# Patient Record
Sex: FEMALE | Race: WHITE | Employment: UNEMPLOYED | URBAN - METROPOLITAN AREA
[De-identification: names, ages, dates, MRNs, and addresses within clinical notes are randomized per-mention and may not be internally consistent; named-entity substitution may affect disease eponyms.]

---

## 2021-05-17 ENCOUNTER — INITIAL PRENATAL (OUTPATIENT)
Dept: OBGYN CLINIC | Age: 31
End: 2021-05-17
Payer: OTHER GOVERNMENT

## 2021-05-17 VITALS
SYSTOLIC BLOOD PRESSURE: 112 MMHG | DIASTOLIC BLOOD PRESSURE: 72 MMHG | BODY MASS INDEX: 24.51 KG/M2 | HEIGHT: 65 IN | WEIGHT: 147.13 LBS

## 2021-05-17 DIAGNOSIS — Z3A.31 31 WEEKS GESTATION OF PREGNANCY: Primary | ICD-10-CM

## 2021-05-17 PROCEDURE — 0501F PRENATAL FLOW SHEET: CPT | Performed by: OBSTETRICS & GYNECOLOGY

## 2021-05-17 NOTE — PROGRESS NOTES
Chief Complaint   Patient presents with    New Patient     Initial OB visit     Visit Vitals  /72 (BP 1 Location: Left upper arm, BP Patient Position: Sitting, BP Cuff Size: Small adult)   Ht 5' 4.5\" (1.638 m)   Wt 147 lb 2 oz (66.7 kg)   BMI 24.86 kg/m²     Record release signed. Had PN labs/pap with MD in [place]. Breast pump order given. OB packet NV.

## 2021-05-17 NOTE — PROGRESS NOTES
Gian Easton is a [id number] [id number], 31 y.o. female   No LMP recorded. Patient is pregnant. She presents for her new OB    She is having no significant problems. Menstrual status:  Cycles are pregnant. Flow: absent. She does not have dysmenorrhea. Medical conditions:  Since her last annual GYN exam about one year ago, she has not the following changes in her health history: none. Mammogram History:    GRIS Results (most recent):  No results found for this or any previous visit. DEXA Results (most recent):  No results found for this or any previous visit. History reviewed. No pertinent past medical history. History reviewed. No pertinent surgical history. Prior to Admission medications    Medication Sig Start Date End Date Taking? Authorizing Provider   prenatal vit37/iron/folic acid (PRENATA PO) Take  by mouth. Yes Provider, Historical   AMBULATORY BREAST PUMP 1 unit 5/17/21  Yes Sanjuanita Melgar MD       No Known Allergies       Tobacco History:  reports that she has never smoked. She has never used smokeless tobacco.  Alcohol Abuse:  reports previous alcohol use. Drug Abuse:  reports no history of drug use. Family Medical/Cancer History:   Family History   Problem Relation Age of Onset    Breast Cancer Mother           Review of Systems   Constitutional: Negative for chills, fever, malaise/fatigue and weight loss. HENT: Negative for congestion, ear pain, sinus pain and tinnitus. Eyes: Negative for blurred vision and double vision. Respiratory: Negative for cough, shortness of breath and wheezing. Cardiovascular: Negative for chest pain and palpitations. Gastrointestinal: Negative for abdominal pain, blood in stool, constipation, diarrhea, heartburn, nausea and vomiting. Genitourinary: Negative for dysuria, flank pain, frequency, hematuria and urgency. Musculoskeletal: Negative for joint pain and myalgias. Skin: Negative for itching and rash. Neurological: Negative for dizziness, weakness and headaches. Psychiatric/Behavioral: Negative for depression, memory loss and suicidal ideas. The patient is not nervous/anxious and does not have insomnia. Physical Exam  Constitutional:       Appearance: Normal appearance. HENT:      Head: Normocephalic and atraumatic. Abdominal:      General: Abdomen is protuberant. Comments: Gravid  +FHT's  Vtx on bedside US   Neurological:      Mental Status: She is alert. Psychiatric:         Mood and Affect: Mood normal.         Behavior: Behavior normal.         Thought Content: Thought content normal.          Visit Vitals  /72 (BP 1 Location: Left upper arm, BP Patient Position: Sitting, BP Cuff Size: Small adult)   Ht 5' 4.5\" (1.638 m)   Wt 147 lb 2 oz (66.7 kg)   BMI 24.86 kg/m²         Assessment:  Diagnoses and all orders for this visit:    1. 31 weeks gestation of pregnancy    Other orders  -     AMBULATORY BREAST PUMP; 1 unit        Plan:Questions addressed  Counseled re: diet, exercise, healthy lifestyle  Get PN records      Follow-up and Dispositions    · Return in about 2 weeks (around 5/31/2021) for OB Visit.

## 2021-06-01 ENCOUNTER — ROUTINE PRENATAL (OUTPATIENT)
Dept: OBGYN CLINIC | Age: 31
End: 2021-06-01
Payer: OTHER GOVERNMENT

## 2021-06-01 VITALS
WEIGHT: 148 LBS | BODY MASS INDEX: 24.66 KG/M2 | DIASTOLIC BLOOD PRESSURE: 62 MMHG | SYSTOLIC BLOOD PRESSURE: 116 MMHG | HEIGHT: 65 IN

## 2021-06-01 DIAGNOSIS — Z3A.33 33 WEEKS GESTATION OF PREGNANCY: Primary | ICD-10-CM

## 2021-06-01 PROCEDURE — 0502F SUBSEQUENT PRENATAL CARE: CPT | Performed by: OBSTETRICS & GYNECOLOGY

## 2021-06-01 NOTE — PROGRESS NOTES
Questions addressed  [facility] info in chart from previous MD  GS: [phone]- DWP

## 2021-06-01 NOTE — PROGRESS NOTES
Chief Complaint   Patient presents with    Follow-up     ob     Visit Vitals  /62 (BP 1 Location: Left upper arm, BP Patient Position: Sitting, BP Cuff Size: Small adult)   Ht 5' 4.5\" (1.638 m)   Wt 148 lb (67.1 kg)   BMI 25.01 kg/m²     GBS/Hgb nv

## 2021-06-16 ENCOUNTER — ROUTINE PRENATAL (OUTPATIENT)
Dept: OBGYN CLINIC | Age: 31
End: 2021-06-16
Payer: OTHER GOVERNMENT

## 2021-06-16 VITALS
WEIGHT: 150 LBS | SYSTOLIC BLOOD PRESSURE: 110 MMHG | DIASTOLIC BLOOD PRESSURE: 62 MMHG | BODY MASS INDEX: 24.99 KG/M2 | HEIGHT: 65 IN

## 2021-06-16 DIAGNOSIS — Z13.83 SCREENING FOR RESPIRATORY CONDITION: Primary | ICD-10-CM

## 2021-06-16 DIAGNOSIS — Z36.85 ANTENATAL SCREENING FOR STREPTOCOCCUS B: ICD-10-CM

## 2021-06-16 DIAGNOSIS — Z3A.35 35 WEEKS GESTATION OF PREGNANCY: ICD-10-CM

## 2021-06-16 PROCEDURE — 0502F SUBSEQUENT PRENATAL CARE: CPT | Performed by: OBSTETRICS & GYNECOLOGY

## 2021-06-16 NOTE — PROGRESS NOTES
Chief Complaint   Patient presents with    Follow-up     ob     Visit Vitals  /62 (BP 1 Location: Left upper arm, BP Patient Position: Sitting, BP Cuff Size: Small adult)   Ht 5' 4.5\" (1.638 m)   Wt 150 lb (68 kg)   BMI 25.35 kg/m²     GBS/ Covid order done & faxed today.

## 2021-06-20 LAB — B-HEM STREP SPEC QL CULT: NEGATIVE

## 2021-06-28 ENCOUNTER — ROUTINE PRENATAL (OUTPATIENT)
Dept: OBGYN CLINIC | Age: 31
End: 2021-06-28
Payer: OTHER GOVERNMENT

## 2021-06-28 VITALS
SYSTOLIC BLOOD PRESSURE: 114 MMHG | HEIGHT: 65 IN | DIASTOLIC BLOOD PRESSURE: 72 MMHG | WEIGHT: 152 LBS | BODY MASS INDEX: 25.33 KG/M2

## 2021-06-28 DIAGNOSIS — Z3A.37 37 WEEKS GESTATION OF PREGNANCY: Primary | ICD-10-CM

## 2021-06-28 PROCEDURE — 0502F SUBSEQUENT PRENATAL CARE: CPT | Performed by: OBSTETRICS & GYNECOLOGY

## 2021-06-28 NOTE — PROGRESS NOTES
Cc  Visit Vitals  /72 (BP 1 Location: Left upper arm, BP Patient Position: Sitting, BP Cuff Size: Small adult)   Ht 5' 4.5\" (1.638 m)   Wt 152 lb (68.9 kg)   BMI 25.69 kg/m²

## 2021-07-08 ENCOUNTER — ROUTINE PRENATAL (OUTPATIENT)
Dept: OBGYN CLINIC | Age: 31
End: 2021-07-08
Payer: OTHER GOVERNMENT

## 2021-07-08 ENCOUNTER — HOSPITAL ENCOUNTER (INPATIENT)
Age: 31
LOS: 4 days | Discharge: HOME | DRG: 773 | End: 2021-07-12
Attending: OBSTETRICS & GYNECOLOGY | Admitting: OBSTETRICS & GYNECOLOGY
Payer: OTHER GOVERNMENT

## 2021-07-08 VITALS
SYSTOLIC BLOOD PRESSURE: 124 MMHG | DIASTOLIC BLOOD PRESSURE: 72 MMHG | WEIGHT: 155.19 LBS | BODY MASS INDEX: 25.85 KG/M2 | HEIGHT: 65 IN

## 2021-07-08 DIAGNOSIS — Z3A.38 38 WEEKS GESTATION OF PREGNANCY: Primary | ICD-10-CM

## 2021-07-08 DIAGNOSIS — G89.18 POST-OP PAIN: Primary | ICD-10-CM

## 2021-07-08 PROBLEM — Z34.90 PREGNANCY: Status: ACTIVE | Noted: 2021-07-08

## 2021-07-08 LAB
AMPHET UR QL SCN: NEGATIVE
APPEARANCE UR: ABNORMAL
BACTERIA URNS QL MICRO: NEGATIVE /HPF
BARBITURATES UR QL SCN: NEGATIVE
BASOPHILS # BLD: 0 K/UL (ref 0–0.1)
BASOPHILS NFR BLD: 0 % (ref 0–1)
BENZODIAZ UR QL: NEGATIVE
BILIRUB UR QL: NEGATIVE
CANNABINOIDS UR QL SCN: NEGATIVE
COCAINE UR QL SCN: NEGATIVE
COLOR UR: ABNORMAL
DAILY QC (YES/NO)?: YES
DIFFERENTIAL METHOD BLD: NORMAL
DRUG SCRN COMMENT,DRGCM: NORMAL
EOSINOPHIL # BLD: 0.1 K/UL (ref 0–0.4)
EOSINOPHIL NFR BLD: 1 % (ref 0–7)
ERYTHROCYTE [DISTWIDTH] IN BLOOD BY AUTOMATED COUNT: 12.2 % (ref 11.5–14.5)
GLUCOSE UR STRIP.AUTO-MCNC: NEGATIVE MG/DL
HCT VFR BLD AUTO: 39.4 % (ref 35–47)
HGB BLD-MCNC: 13.2 G/DL (ref 11.5–16)
HGB UR QL STRIP: ABNORMAL
IMM GRANULOCYTES # BLD AUTO: 0 K/UL (ref 0–0.04)
IMM GRANULOCYTES NFR BLD AUTO: 0 % (ref 0–0.5)
KETONES UR QL STRIP.AUTO: NEGATIVE MG/DL
LEUKOCYTE ESTERASE UR QL STRIP.AUTO: ABNORMAL
LYMPHOCYTES # BLD: 1.8 K/UL (ref 0.8–3.5)
LYMPHOCYTES NFR BLD: 18 % (ref 12–49)
MCH RBC QN AUTO: 31.5 PG (ref 26–34)
MCHC RBC AUTO-ENTMCNC: 33.5 G/DL (ref 30–36.5)
MCV RBC AUTO: 94 FL (ref 80–99)
METHADONE UR QL: NEGATIVE
MONOCYTES # BLD: 0.7 K/UL (ref 0–1)
MONOCYTES NFR BLD: 8 % (ref 5–13)
MUCOUS THREADS URNS QL MICRO: ABNORMAL /LPF
NEUTS SEG # BLD: 7 K/UL (ref 1.8–8)
NEUTS SEG NFR BLD: 73 % (ref 32–75)
NITRITE UR QL STRIP.AUTO: NEGATIVE
NRBC # BLD: 0 K/UL (ref 0–0.01)
NRBC BLD-RTO: 0 PER 100 WBC
OPIATES UR QL: NEGATIVE
PCP UR QL: NEGATIVE
PH UR STRIP: 6 [PH] (ref 5–8)
PH, VAGINAL FLUID: 4 (ref 5–6.1)
PLATELET # BLD AUTO: 273 K/UL (ref 150–400)
PMV BLD AUTO: 10.4 FL (ref 8.9–12.9)
PROT UR STRIP-MCNC: 100 MG/DL
RBC # BLD AUTO: 4.19 M/UL (ref 3.8–5.2)
RBC #/AREA URNS HPF: ABNORMAL /HPF (ref 0–5)
SARS-COV-2, COV2: NORMAL
SP GR UR REFRACTOMETRY: 1.03 (ref 1–1.03)
UROBILINOGEN UR QL STRIP.AUTO: 0.1 EU/DL (ref 0.1–1)
WBC # BLD AUTO: 9.7 K/UL (ref 3.6–11)
WBC URNS QL MICRO: ABNORMAL /HPF (ref 0–4)

## 2021-07-08 PROCEDURE — 81001 URINALYSIS AUTO W/SCOPE: CPT

## 2021-07-08 PROCEDURE — 99285 EMERGENCY DEPT VISIT HI MDM: CPT

## 2021-07-08 PROCEDURE — 96361 HYDRATE IV INFUSION ADD-ON: CPT

## 2021-07-08 PROCEDURE — 87635 SARS-COV-2 COVID-19 AMP PRB: CPT

## 2021-07-08 PROCEDURE — 80307 DRUG TEST PRSMV CHEM ANLYZR: CPT

## 2021-07-08 PROCEDURE — 65410000002 HC RM PRIVATE OB

## 2021-07-08 PROCEDURE — 83986 ASSAY PH BODY FLUID NOS: CPT | Performed by: OBSTETRICS & GYNECOLOGY

## 2021-07-08 PROCEDURE — 96360 HYDRATION IV INFUSION INIT: CPT

## 2021-07-08 PROCEDURE — 36415 COLL VENOUS BLD VENIPUNCTURE: CPT

## 2021-07-08 PROCEDURE — 86923 COMPATIBILITY TEST ELECTRIC: CPT

## 2021-07-08 PROCEDURE — 80081 OBSTETRIC PANEL INC HIV TSTG: CPT

## 2021-07-08 PROCEDURE — 74011250636 HC RX REV CODE- 250/636: Performed by: OBSTETRICS & GYNECOLOGY

## 2021-07-08 PROCEDURE — 0502F SUBSEQUENT PRENATAL CARE: CPT | Performed by: OBSTETRICS & GYNECOLOGY

## 2021-07-08 RX ORDER — ACETAMINOPHEN 325 MG/1
650 TABLET ORAL
Status: DISCONTINUED | OUTPATIENT
Start: 2021-07-08 | End: 2021-07-10

## 2021-07-08 RX ORDER — OXYTOCIN/RINGER'S LACTATE 30/500 ML
10 PLASTIC BAG, INJECTION (ML) INTRAVENOUS AS NEEDED
Status: DISCONTINUED | OUTPATIENT
Start: 2021-07-08 | End: 2021-07-10

## 2021-07-08 RX ORDER — OXYTOCIN/RINGER'S LACTATE 30/500 ML
87.3 PLASTIC BAG, INJECTION (ML) INTRAVENOUS AS NEEDED
Status: COMPLETED | OUTPATIENT
Start: 2021-07-08 | End: 2021-07-10

## 2021-07-08 RX ORDER — LIDOCAINE HYDROCHLORIDE 10 MG/ML
30 INJECTION INFILTRATION; PERINEURAL
Status: ACTIVE | OUTPATIENT
Start: 2021-07-08 | End: 2021-07-09

## 2021-07-08 RX ORDER — SODIUM CHLORIDE, SODIUM LACTATE, POTASSIUM CHLORIDE, CALCIUM CHLORIDE 600; 310; 30; 20 MG/100ML; MG/100ML; MG/100ML; MG/100ML
125 INJECTION, SOLUTION INTRAVENOUS CONTINUOUS
Status: DISCONTINUED | OUTPATIENT
Start: 2021-07-08 | End: 2021-07-12 | Stop reason: HOSPADM

## 2021-07-08 RX ORDER — ONDANSETRON 2 MG/ML
4 INJECTION INTRAMUSCULAR; INTRAVENOUS
Status: DISCONTINUED | OUTPATIENT
Start: 2021-07-08 | End: 2021-07-10

## 2021-07-08 RX ADMIN — SODIUM CHLORIDE, POTASSIUM CHLORIDE, SODIUM LACTATE AND CALCIUM CHLORIDE 1000 ML: 600; 310; 30; 20 INJECTION, SOLUTION INTRAVENOUS at 19:40

## 2021-07-08 RX ADMIN — SODIUM CHLORIDE, POTASSIUM CHLORIDE, SODIUM LACTATE AND CALCIUM CHLORIDE 125 ML/HR: 600; 310; 30; 20 INJECTION, SOLUTION INTRAVENOUS at 20:46

## 2021-07-08 RX ADMIN — SODIUM CHLORIDE, POTASSIUM CHLORIDE, SODIUM LACTATE AND CALCIUM CHLORIDE 1000 ML: 600; 310; 30; 20 INJECTION, SOLUTION INTRAVENOUS at 18:20

## 2021-07-08 NOTE — PROGRESS NOTES
No chief complaint on file.     Visit Vitals  /72 (BP 1 Location: Right arm, BP Patient Position: Sitting, BP Cuff Size: Small adult)   Ht 5' 4.5\" (1.638 m)   Wt 155 lb 3 oz (70.4 kg)   BMI 26.23 kg/m²

## 2021-07-08 NOTE — PROGRESS NOTES
1800: Patient arrived ambulatory to the unit from home. Patient was seen in Dr. Dirk Ruggiero' office today and was 3 cm and seen due to \"loss of mucus plug and vaginal bleeding. \" Patient said she lost it last night. When she showed a picture, a bloody clot was shown-patient to show Dr. Emerson Steve when he evaluates patient. Patient denies any pain at this time. Patient endorses fetal movement. Patient denies contractions, leaking of fluids, shortness of breath, or swelling. Patient states she did have sexual intercourse last night. Patient escorted to bathroom where she was instructed to leave a urine sample. 1812: EFM initiated at this time. Triage and assessment started. 1814: Dr. Emerson Steve aware of patient's arrival. Orders received for peripheral IV, CBC, and LR bolus. Orders placed. Dr. Emerson Steve to check patient. 1900: Report given to oncoming RN.

## 2021-07-08 NOTE — PROGRESS NOTES
Passed bloody mucus plug this AM, since has had some spotting every time she goes to the bathroom sees some blood( wearing a thin pad)  Denies any real UC's just lower cramps  FM present but decreased   DWP- will send to L and D for monitoring given bleeding  Questions addressed  Planned delivery form signed for IOL next week if pt is d/c'ed

## 2021-07-09 ENCOUNTER — ANESTHESIA EVENT (OUTPATIENT)
Dept: LABOR AND DELIVERY | Age: 31
DRG: 773 | End: 2021-07-09
Payer: OTHER GOVERNMENT

## 2021-07-09 ENCOUNTER — APPOINTMENT (OUTPATIENT)
Dept: ULTRASOUND IMAGING | Age: 31
DRG: 773 | End: 2021-07-09
Attending: OBSTETRICS & GYNECOLOGY
Payer: OTHER GOVERNMENT

## 2021-07-09 LAB
COVID-19 RAPID TEST, COVR: NOT DETECTED
HBV SURFACE AG SER QL: <0.1 INDEX
HBV SURFACE AG SER QL: NEGATIVE
HIV 1+2 AB+HIV1 P24 AG SERPL QL IA: NONREACTIVE
HIV12 RESULT COMMENT, HHIVC: NORMAL
RPR SER QL: NONREACTIVE
RUBV IGG SER-IMP: REACTIVE
RUBV IGG SERPL IA-ACNC: 327.5 IU/ML
SPECIMEN SOURCE: NORMAL

## 2021-07-09 PROCEDURE — 99285 EMERGENCY DEPT VISIT HI MDM: CPT

## 2021-07-09 PROCEDURE — 76815 OB US LIMITED FETUS(S): CPT

## 2021-07-09 PROCEDURE — 74011250636 HC RX REV CODE- 250/636: Performed by: OBSTETRICS & GYNECOLOGY

## 2021-07-09 PROCEDURE — 96360 HYDRATION IV INFUSION INIT: CPT

## 2021-07-09 PROCEDURE — 65410000002 HC RM PRIVATE OB

## 2021-07-09 PROCEDURE — 96361 HYDRATE IV INFUSION ADD-ON: CPT

## 2021-07-09 RX ORDER — SODIUM CHLORIDE 0.9 % (FLUSH) 0.9 %
5-40 SYRINGE (ML) INJECTION EVERY 8 HOURS
Status: CANCELLED | OUTPATIENT
Start: 2021-07-09

## 2021-07-09 RX ORDER — OXYTOCIN/RINGER'S LACTATE 30/500 ML
10 PLASTIC BAG, INJECTION (ML) INTRAVENOUS AS NEEDED
Status: CANCELLED | OUTPATIENT
Start: 2021-07-09

## 2021-07-09 RX ORDER — SODIUM CHLORIDE 0.9 % (FLUSH) 0.9 %
5-40 SYRINGE (ML) INJECTION AS NEEDED
Status: CANCELLED | OUTPATIENT
Start: 2021-07-09

## 2021-07-09 RX ORDER — SODIUM CHLORIDE, SODIUM LACTATE, POTASSIUM CHLORIDE, CALCIUM CHLORIDE 600; 310; 30; 20 MG/100ML; MG/100ML; MG/100ML; MG/100ML
1000 INJECTION, SOLUTION INTRAVENOUS CONTINUOUS
Status: CANCELLED | OUTPATIENT
Start: 2021-07-09

## 2021-07-09 RX ORDER — OXYTOCIN/RINGER'S LACTATE 30/500 ML
87.3 PLASTIC BAG, INJECTION (ML) INTRAVENOUS AS NEEDED
Status: CANCELLED | OUTPATIENT
Start: 2021-07-09

## 2021-07-09 RX ADMIN — SODIUM CHLORIDE, POTASSIUM CHLORIDE, SODIUM LACTATE AND CALCIUM CHLORIDE 125 ML/HR: 600; 310; 30; 20 INJECTION, SOLUTION INTRAVENOUS at 13:04

## 2021-07-09 RX ADMIN — SODIUM CHLORIDE, POTASSIUM CHLORIDE, SODIUM LACTATE AND CALCIUM CHLORIDE 125 ML/HR: 600; 310; 30; 20 INJECTION, SOLUTION INTRAVENOUS at 23:15

## 2021-07-09 RX ADMIN — SODIUM CHLORIDE, POTASSIUM CHLORIDE, SODIUM LACTATE AND CALCIUM CHLORIDE 125 ML/HR: 600; 310; 30; 20 INJECTION, SOLUTION INTRAVENOUS at 04:59

## 2021-07-09 NOTE — PROGRESS NOTES
1010- Dr. Tae Tee to bedside to assess patient and discuss plan of care. Pt had no new complaints, and was agreeable to the plan to continue monitoring after SVE. 1013- SVE performed. 1912- Bedside report given to TIRSO Patiño RN. Patient in bed resting comfortably, SO at bedside.

## 2021-07-09 NOTE — PROGRESS NOTES
1912: Bedside shift report received from GLENDY Beasley RN.    2000: Shift assessment completed at this time. 2015: Patient sitting up eating dinner at this time. 2024: Spoke with Dr. Lois Cotto in regards to patient passing 2 blood clots at this time. No new orders received at this time. 2215: Nurse at bedside, assisted patient to the bathroom at this time. Scant amount of brown blood noted. 0000: Nurse at bedside, Reassessment completed. Patient educated on NPO status and drinks removed from bedside table at this time. Patient able to ambulate to the bathroom with no assistance at this time.     0302: Patient called out on call bell stating \"my water just broke\". 2 RNs at bedside at this time. Patient sitting up in bed with moderate amount of clear fluid on pads noted. Patient ambulated to the bathroom to void at this time. 0315: Patient updated on POC and Questions answered. 9316: Urinary catheter inserted at this time. 0505: Nurse at bedside. Pads changed at this time. 0710: Bedside report given to GLENDY Guillen

## 2021-07-09 NOTE — PROGRESS NOTES
Labor Progress Note  Patient seen, fetal heart rate and contraction pattern evaluated, patient examined. No data found. Physical Exam:  Cervical Exam:  deferred  Membranes:  Intact  Uterine Activity: None  Fetal Heart Rate: Reactive    Assessment/Plan:  Ultrasound reviewed-  Placenta previa on ultrasound. Results reviewed with patient. Discussed with paitent in light of findings plan for csection secondary to findings. Questions answered.   Patinet to be placed NPO after midnight and schedule csection for morning

## 2021-07-09 NOTE — ANESTHESIA PREPROCEDURE EVALUATION
Relevant Problems   No relevant active problems       Anesthetic History   No history of anesthetic complications            Review of Systems / Medical History  Patient summary reviewed, nursing notes reviewed and pertinent labs reviewed    Pulmonary  Within defined limits                 Neuro/Psych   Within defined limits           Cardiovascular                  Exercise tolerance: >4 METS     GI/Hepatic/Renal  Within defined limits              Endo/Other  Within defined limits          Comments: > 38 weeks gestation,   Placenta previa Other Findings   Comments: Hx of right breast lump. Placenta previa       Past Medical History:   Diagnosis Date    History of lump of right breast 2020       History reviewed. No pertinent surgical history.     Current Outpatient Medications   Medication Instructions    AMBULATORY BREAST PUMP 1 unit    prenatal vit37/iron/folic acid (PRENATA PO) Oral       Current Facility-Administered Medications   Medication Dose Route Frequency    0.9% sodium chloride infusion 250 mL  250 mL IntraVENous PRN    0.9% sodium chloride infusion 250 mL  250 mL IntraVENous PRN    lactated Ringers infusion  125 mL/hr IntraVENous CONTINUOUS    oxytocin (PITOCIN) 10 unit bolus from bag  10 Units IntraVENous PRN    And    oxytocin (PITOCIN) 30 units/500 ml LR  87.3 randolph-units/min IntraVENous PRN    ondansetron (ZOFRAN) injection 4 mg  4 mg IntraVENous Q6H PRN    acetaminophen (TYLENOL) tablet 650 mg  650 mg Oral Q4H PRN       Patient Vitals for the past 24 hrs:   Temp Pulse Resp BP SpO2   07/10/21 0817  68  125/69    07/10/21 0800 36.9 °C (98.4 °F)       07/10/21 0603     97 %   07/10/21 0600 36.6 °C (97.8 °F)   121/74    07/10/21 0559  78      07/10/21 0558     96 %   07/10/21 0553     96 %   07/10/21 0548     96 %   07/10/21 0543     95 %   07/10/21 0538     96 %   07/10/21 0533     97 %   07/10/21 0528     96 %   07/10/21 0523     97 %   07/10/21 0518     97 %   07/10/21 0513     97 %   07/10/21 0508     98 %   07/10/21 0503     95 %   07/10/21 0500    126/77    07/10/21 0428     96 %   07/10/21 0423     96 %   07/10/21 0418     96 %   07/10/21 0413     96 %   07/10/21 0408     96 %   07/10/21 0403     98 %   07/10/21 0400 (P) 36.9 °C (98.4 °F)   124/71    07/10/21 0359  94      07/10/21 0358     96 %   07/10/21 0353     97 %   07/10/21 0348     96 %   07/10/21 0343     96 %   07/10/21 0338     97 %   07/10/21 0333 36.9 °C (98.4 °F)    97 %   07/10/21 0328     98 %   07/10/21 0323     99 %   07/10/21 0318     98 %   07/10/21 0317  69  116/74    07/10/21 0313     95 %   07/10/21 0305     98 %   07/10/21 0300    116/74 96 %   07/10/21 0259  69   96 %   07/10/21 0254     96 %   07/10/21 0249     97 %   07/10/21 0244     96 %   07/10/21 0239     96 %   07/10/21 0234     97 %   07/10/21 0229     96 %   07/10/21 0224     96 %   07/10/21 0219     96 %   07/10/21 0214     95 %   07/10/21 0209     95 %   07/10/21 0204     95 %   07/10/21 0200    124/75    07/10/21 0159  77   95 %   07/10/21 0154     95 %   07/10/21 0149     95 %   07/10/21 0144     94 %   07/10/21 0139     95 %   07/10/21 0134     95 %   07/10/21 0129     96 %   07/10/21 0124     95 %   07/10/21 0119     95 %   07/10/21 0114     95 %   07/10/21 0109     96 %   07/10/21 0104     96 %   07/10/21 0100    115/67    07/10/21 0059  79   96 %   07/10/21 0054     96 %   07/10/21 0049     96 %   07/10/21 0044     96 %   07/10/21 0039     97 %   07/10/21 0034     97 %   07/10/21 0024     97 %   07/10/21 0019     98 %   07/10/21 0014     98 %   07/10/21 0004     97 %   07/10/21 0000 36.8 °C (98.3 °F)       07/09/21 2359  82  121/63 97 %   07/09/21 [address]   96 %   07/09/21 2349     97 %   07/09/21 2344     95 %   07/09/21 2339     97 %   07/09/21 2334     97 %   07/09/21 2329     97 %   07/09/21 2324     96 %   07/09/21 2319     95 %   07/09/21 2314     97 %   07/09/21 2309     97 %   07/09/21 2304     96 %   07/09/21 2259  85  120/69 96 %   07/09/21 2254     96 %   07/09/21 2249     97 %   07/09/21 2244     97 %   07/09/21 2239     97 %   07/09/21 2234     97 %   07/09/21 2229     98 %   07/09/21 2224     97 %   07/09/21 2219     98 %   07/09/21 2159  89  121/68 97 %   07/09/21 2154     97 %   07/09/21 2149     97 %   07/09/21 2144     97 %   07/09/21 2139     97 %   07/09/21 2134     97 %   07/09/21 2129     97 %   07/09/21 2124     97 %   07/09/21 2119     97 %   07/09/21 2114     97 %   07/09/21 2109     98 %   07/09/21 2104  98  130/83 97 %   07/09/21 2059     94 %   07/09/21 2016     98 %   07/09/21 2011     97 %   07/09/21 2006     96 %   07/09/21 2001     97 %   07/09/21 2000 37.1 °C (98.7 °F) 83 18 124/82 97 %   07/09/21 1959  83  124/82    07/09/21 1957  88  127/76    07/09/21 1956     97 %   07/09/21 1605     96 %   07/09/21 1600  90   97 %   07/09/21 1559    122/74    07/09/21 1555     97 %   07/09/21 1550     97 %   07/09/21 1545     98 %   07/09/21 1539     97 %   07/09/21 1534     97 %   07/09/21 1529     96 %   07/09/21 1524     96 %   07/09/21 1519     96 %   07/09/21 1514     96 %   07/09/21 1509     96 %   07/09/21 1504     97 %   07/09/21 1459  91  119/67 96 %   07/09/21 1454     97 %   07/09/21 1449     97 %   07/09/21 1444     97 %   07/09/21 1439     98 %   07/09/21 1434     97 %   07/09/21 1429     98 %   07/09/21 1424     98 %   07/09/21 1419     95 %   07/09/21 1415  100  126/87    07/09/21 1408     98 %   07/09/21 1403     98 %   07/09/21 1147 36.9 °C (98.4 °F)  19  97 %   07/09/21 1142     98 %   07/09/21 1137     97 %   07/09/21 1132     97 %   07/09/21 1128  (!) 101   97 %   07/09/21 1127    123/70    07/09/21 1123     97 %   07/09/21 1118     97 %   07/09/21 1113     97 %   07/09/21 1108     97 %   07/09/21 1103     97 %   07/09/21 1058     98 %   07/09/21 1053     97 %   07/09/21 1048     98 %   07/09/21 1043     97 %   07/09/21 1038     97 %   07/09/21 1033     97 %   07/09/21 0913     98 %       Lab Results   Component Value Date/Time    WBC 9.7 07/08/2021 06:15 PM    HGB 13.2 07/08/2021 06:15 PM    HCT 39.4 07/08/2021 06:15 PM    PLATELET 841 87/57/7678 06:15 PM    MCV 94.0 07/08/2021 06:15 PM     No results found for: NA, K, CL, CO2, AGAP, GLU, BUN, CREA, BUCR, GFRAA, GFRNA, CA, GFRAA  No results found for: APTT, PTP, INR, INREXT  No results found for: GLU, GLUCPOC  Physical Exam    Airway  Mallampati: I  TM Distance: 4 - 6 cm  Neck ROM: normal range of motion   Mouth opening: Normal     Cardiovascular    Rhythm: regular  Rate: normal         Dental  No notable dental hx       Pulmonary  Breath sounds clear to auscultation               Abdominal  GI exam deferred       Other Findings            Anesthetic Plan    ASA: 2  Anesthesia type: spinal            Anesthetic plan and risks discussed with: Patient and Family      Benefits and risks of spinal anesthesia discussed with patient/family. All questions answered.

## 2021-07-09 NOTE — PROGRESS NOTES
7357 Report received from Merlinda Idler, RN to assume care of patient. 0136 RN at bedside adjusting EFM    1000 Dr. Suzie Zhu present on unit. Update provided. 1010 Dr. Suzie Zhu to bedside to assess patient and develop a plan of care. [address] of care to monitor patient following SVE.     1219 EFM removed for ultrasound. 1235 EFM replaced following ultrasound. Patient resting in bed; lying on left tilt. Denies any complaints or requests at this time. 1250 Dr. Suzie Zhu informed ultrasound complete-awaiting results. 65 Dr. Suzie Zhu reviewing ultrasound results. [id number] Dr. Suzie Zhu to patient bedside. Plan of care discussed and findings of previa on ultrasound. Education of previa provided to patient; patient verbalized understanding. Patient to have a primary  section tomorrow. Dr. Suzie Zhu explained to patient she is able to eat a regular diet until midnight tonight and will be NPO following. Dr. Suzie Zhu explained she communicate with anesthesia and will coordinate a time. Shane 24 Dr. Suzie Zhu reviewed tracing. 26 Dr. Suzie Zhu communicating with Dr. Carter He to perform section at 0930 tomorrow. 1700 Dr. Suzie Zhu discussed plan of care again with patient and S.O. Plan to perform csection at 0930 tomorrow. Patient and S.O. agreeable to plan and verbalized understanding. Patient encouraged to report any bleeding. [id number] EFM removed; patient up to bathroom to shower with permission of Dr. Suzie Zhu; no vaginal bleeding present. Patient returned to bed; EFM replaced-no new orders received. 200 Dr. Martín Woodson at bedside for evaluation and to obtain consent.  Bedside shift report provided to TIRSO Arroyo RN by GLENDY Beasley, RN to assume care of patient. Monico Peña RN rounded on patient. Patient resting with daughter and  on bedside. Denies any complaints or requests at this time.

## 2021-07-09 NOTE — H&P
OB ADMISSION NOTE    CHIEF COMPLAINT/HISTORY OF PRESENT ILLNESS: The patient is a 31year-old with an IUP of 38-3/7 weeks who is a G:3 P:1,0,1,1 with an ROSAMARIA of 19 JUL 2021. Blood type and group and rubella status is not available. Her GBS status is negative. She has no history of chronic illness. She is a patient of Dr. Johann Richardson. She presented to [facility] with complaints of contractions every 5 minutes. She also complains of vaginal bleeding. Denies leak of water. She did have fetal movements. MEDICATIONS: Prenatal multivitamins. PHYSICAL EXAM:  Visit Vitals  /74   Pulse 75   Temp 98.3 °F (36.8 °C)   Resp 20   Ht 5' 4\" (1.626 m)   Wt 70.3 kg (155 lb)   SpO2 99%   Breastfeeding No   BMI 26.61 kg/m²     LUNGS: Clear to auscultation  HEART: Regular rhythm, no murmurs. Abdomen: Gravid, fetal heart tones present. PELVIC EXAM: Dilation:2-3cm, Effacement:60%, Station:-3, Presentation: Vertex, membranes intact. Significant bloody show noted. Recent Results (from the past 24 hour(s))   CBC WITH AUTOMATED DIFF    Collection Time: 07/08/21  6:15 PM   Result Value Ref Range    WBC 9.7 3.6 - 11.0 K/uL    RBC 4.19 3.80 - 5.20 M/uL    HGB 13.2 11.5 - 16.0 g/dL    HCT 39.4 35.0 - 47.0 %    MCV 94.0 80.0 - 99.0 FL    MCH 31.5 26.0 - 34.0 PG    MCHC 33.5 30.0 - 36.5 g/dL    RDW 12.2 11.5 - 14.5 %    PLATELET 591 942 - 553 K/uL    MPV 10.4 8.9 - 12.9 FL    NRBC 0.0 0.0  WBC    ABSOLUTE NRBC 0.00 0.00 - 0.01 K/uL    NEUTROPHILS 73 32 - 75 %    LYMPHOCYTES 18 12 - 49 %    MONOCYTES 8 5 - 13 %    EOSINOPHILS 1 0 - 7 %    BASOPHILS 0 0 - 1 %    IMMATURE GRANULOCYTES 0 0 - 0.5 %    ABS. NEUTROPHILS 7.0 1.8 - 8.0 K/UL    ABS. LYMPHOCYTES 1.8 0.8 - 3.5 K/UL    ABS. MONOCYTES 0.7 0.0 - 1.0 K/UL    ABS. EOSINOPHILS 0.1 0.0 - 0.4 K/UL    ABS. BASOPHILS 0.0 0.0 - 0.1 K/UL    ABS. IMM.  GRANS. 0.0 0.00 - 0.04 K/UL    DF AUTOMATED     URINALYSIS W/MICROSCOPIC    Collection Time: 07/08/21  6:30 PM   Result Value Ref Range    Color [place]      Appearance Turbid (A) Clear      Specific gravity 1.028 1.003 - 1.030      pH (UA) 6.0 5.0 - 8.0      Protein 100 (A) Negative mg/dL    Glucose Negative Negative mg/dL    Ketone Negative Negative mg/dL    Bilirubin Negative Negative      Blood Large (A) Negative      Urobilinogen 0.1 0.1 - 1.0 EU/dL    Nitrites Negative Negative      Leukocyte Esterase Small (A) Negative      WBC 10-20 0 - 4 /hpf    RBC  0 - 5 /hpf    Bacteria Negative Negative /hpf    Mucus Trace /lpf     Fetal monitoring: Category 1      ASSESSMENT:   Pregnancy at 38-3/7 weeks gestation in early labor  Significant bloody show    Plan: Admit to OB bertrand, continue fetal monitoring, please see admit orders.

## 2021-07-09 NOTE — PROGRESS NOTES
1855  BS report received from GLENDY Cooley.    2028  Dr. Sintia Richards in room to see pt. Made aware pt requesting to eat. 2029  Pt up to BR to void; then, Dr. Sintia Richards returned to pt room for SVE and discuss plan of care. Scant light brown blood noted to disposable chux from pt's perineum. Small amount of red blood noted to toilet paper after pt voided. Dr. Sintia Richards made aware. 2036  SVE via Dr. Sintia Richards. Cervix is soft and 2cm. . Red bleeding noted on exam glove. 2040  Dr. Sintia Richards discussed plan of care with pt and her  to admit pt due to vaginal bleeding and possible early labor. 2046  Supper tray warmed and given to pt to eat. Pt to notify nurse when she is finished eating. [id number]  Pt finished eating. Dr. Sintia Richards in pt room to perform BS US for fetal position. Fetus is vertex. [id number]  Indeterminate FHT's due to pt sitting straight up in bed. FHT's resumed when pt laid back in bed.    2223  Pt thought her membranes may have ruptured when she voided (writer was present at pt side). Nitrazine test performed; was negative with blood noted to strip. 80  Pt states she had no bleeding noted when she voided this time. Pt denies pain.

## 2021-07-10 ENCOUNTER — ANESTHESIA (OUTPATIENT)
Dept: LABOR AND DELIVERY | Age: 31
DRG: 773 | End: 2021-07-10
Payer: OTHER GOVERNMENT

## 2021-07-10 PROCEDURE — 65410000002 HC RM PRIVATE OB

## 2021-07-10 PROCEDURE — 74011000250 HC RX REV CODE- 250: Performed by: ANESTHESIOLOGY

## 2021-07-10 PROCEDURE — 76010000392 HC C SECN EA ADDL 0.5 HR: Performed by: OBSTETRICS & GYNECOLOGY

## 2021-07-10 PROCEDURE — 76060000033 HC ANESTHESIA 1 TO 1.5 HR: Performed by: OBSTETRICS & GYNECOLOGY

## 2021-07-10 PROCEDURE — 76010000391 HC C SECN FIRST 1 HR: Performed by: OBSTETRICS & GYNECOLOGY

## 2021-07-10 PROCEDURE — 74011250636 HC RX REV CODE- 250/636: Performed by: OBSTETRICS & GYNECOLOGY

## 2021-07-10 PROCEDURE — 74011000258 HC RX REV CODE- 258: Performed by: NURSE ANESTHETIST, CERTIFIED REGISTERED

## 2021-07-10 PROCEDURE — 75410000003 HC RECOV DEL/VAG/CSECN EA 0.5 HR

## 2021-07-10 PROCEDURE — 76060000078 HC EPIDURAL ANESTHESIA: Performed by: OBSTETRICS & GYNECOLOGY

## 2021-07-10 PROCEDURE — 74011250636 HC RX REV CODE- 250/636: Performed by: NURSE ANESTHETIST, CERTIFIED REGISTERED

## 2021-07-10 PROCEDURE — 87086 URINE CULTURE/COLONY COUNT: CPT

## 2021-07-10 PROCEDURE — 74011250636 HC RX REV CODE- 250/636: Performed by: ANESTHESIOLOGY

## 2021-07-10 PROCEDURE — 74011250636 HC RX REV CODE- 250/636

## 2021-07-10 PROCEDURE — 75410000002 HC LABOR FEE PER 1 HR

## 2021-07-10 RX ORDER — DEXAMETHASONE SODIUM PHOSPHATE 4 MG/ML
INJECTION, SOLUTION INTRA-ARTICULAR; INTRALESIONAL; INTRAMUSCULAR; INTRAVENOUS; SOFT TISSUE AS NEEDED
Status: DISCONTINUED | OUTPATIENT
Start: 2021-07-10 | End: 2021-07-10 | Stop reason: HOSPADM

## 2021-07-10 RX ORDER — SODIUM CHLORIDE 0.9 % (FLUSH) 0.9 %
5-40 SYRINGE (ML) INJECTION EVERY 8 HOURS
Status: DISCONTINUED | OUTPATIENT
Start: 2021-07-10 | End: 2021-07-12 | Stop reason: HOSPADM

## 2021-07-10 RX ORDER — ZOLPIDEM TARTRATE 5 MG/1
5 TABLET ORAL
Status: DISCONTINUED | OUTPATIENT
Start: 2021-07-10 | End: 2021-07-12 | Stop reason: HOSPADM

## 2021-07-10 RX ORDER — SODIUM CHLORIDE 0.9 % (FLUSH) 0.9 %
5-40 SYRINGE (ML) INJECTION AS NEEDED
Status: DISCONTINUED | OUTPATIENT
Start: 2021-07-10 | End: 2021-07-12 | Stop reason: HOSPADM

## 2021-07-10 RX ORDER — CEFAZOLIN SODIUM 1 G/3ML
INJECTION, POWDER, FOR SOLUTION INTRAMUSCULAR; INTRAVENOUS AS NEEDED
Status: DISCONTINUED | OUTPATIENT
Start: 2021-07-10 | End: 2021-07-10 | Stop reason: HOSPADM

## 2021-07-10 RX ORDER — NALBUPHINE HYDROCHLORIDE 10 MG/ML
5 INJECTION, SOLUTION INTRAMUSCULAR; INTRAVENOUS; SUBCUTANEOUS
Status: DISCONTINUED | OUTPATIENT
Start: 2021-07-10 | End: 2021-07-12 | Stop reason: HOSPADM

## 2021-07-10 RX ORDER — OXYTOCIN/RINGER'S LACTATE 20/1000 ML
PLASTIC BAG, INJECTION (ML) INTRAVENOUS AS NEEDED
Status: DISCONTINUED | OUTPATIENT
Start: 2021-07-10 | End: 2021-07-10 | Stop reason: HOSPADM

## 2021-07-10 RX ORDER — METOCLOPRAMIDE HYDROCHLORIDE 5 MG/ML
INJECTION INTRAMUSCULAR; INTRAVENOUS AS NEEDED
Status: DISCONTINUED | OUTPATIENT
Start: 2021-07-10 | End: 2021-07-10 | Stop reason: HOSPADM

## 2021-07-10 RX ORDER — SODIUM CHLORIDE 9 MG/ML
250 INJECTION, SOLUTION INTRAVENOUS AS NEEDED
Status: DISCONTINUED | OUTPATIENT
Start: 2021-07-10 | End: 2021-07-11

## 2021-07-10 RX ORDER — NALOXONE HYDROCHLORIDE 0.4 MG/ML
0.2 INJECTION, SOLUTION INTRAMUSCULAR; INTRAVENOUS; SUBCUTANEOUS
Status: ACTIVE | OUTPATIENT
Start: 2021-07-10 | End: 2021-07-11

## 2021-07-10 RX ORDER — OXYCODONE AND ACETAMINOPHEN 5; 325 MG/1; MG/1
1 TABLET ORAL
Qty: 30 TABLET | Refills: 0 | Status: SHIPPED | OUTPATIENT
Start: 2021-07-10 | End: 2021-07-19 | Stop reason: ALTCHOICE

## 2021-07-10 RX ORDER — KETOROLAC TROMETHAMINE 30 MG/ML
15 INJECTION, SOLUTION INTRAMUSCULAR; INTRAVENOUS
Status: DISPENSED | OUTPATIENT
Start: 2021-07-10 | End: 2021-07-11

## 2021-07-10 RX ORDER — TERBUTALINE SULFATE 1 MG/ML
0.25 INJECTION SUBCUTANEOUS
Status: COMPLETED | OUTPATIENT
Start: 2021-07-10 | End: 2021-07-10

## 2021-07-10 RX ORDER — SODIUM CHLORIDE, SODIUM LACTATE, POTASSIUM CHLORIDE, CALCIUM CHLORIDE 600; 310; 30; 20 MG/100ML; MG/100ML; MG/100ML; MG/100ML
125 INJECTION, SOLUTION INTRAVENOUS CONTINUOUS
Status: DISCONTINUED | OUTPATIENT
Start: 2021-07-10 | End: 2021-07-12 | Stop reason: HOSPADM

## 2021-07-10 RX ORDER — IBUPROFEN 800 MG/1
800 TABLET ORAL
Qty: 40 TABLET | Refills: 0 | Status: SHIPPED | OUTPATIENT
Start: 2021-07-10 | End: 2021-08-24 | Stop reason: ALTCHOICE

## 2021-07-10 RX ORDER — ONDANSETRON 2 MG/ML
4 INJECTION INTRAMUSCULAR; INTRAVENOUS AS NEEDED
Status: DISCONTINUED | OUTPATIENT
Start: 2021-07-10 | End: 2021-07-12 | Stop reason: HOSPADM

## 2021-07-10 RX ORDER — TERBUTALINE SULFATE 1 MG/ML
INJECTION SUBCUTANEOUS
Status: COMPLETED
Start: 2021-07-10 | End: 2021-07-10

## 2021-07-10 RX ORDER — NALOXONE HYDROCHLORIDE 0.4 MG/ML
0.4 INJECTION, SOLUTION INTRAMUSCULAR; INTRAVENOUS; SUBCUTANEOUS AS NEEDED
Status: DISCONTINUED | OUTPATIENT
Start: 2021-07-10 | End: 2021-07-12 | Stop reason: HOSPADM

## 2021-07-10 RX ORDER — ONDANSETRON 2 MG/ML
INJECTION INTRAMUSCULAR; INTRAVENOUS AS NEEDED
Status: DISCONTINUED | OUTPATIENT
Start: 2021-07-10 | End: 2021-07-10 | Stop reason: HOSPADM

## 2021-07-10 RX ORDER — SODIUM CHLORIDE, SODIUM LACTATE, POTASSIUM CHLORIDE, CALCIUM CHLORIDE 600; 310; 30; 20 MG/100ML; MG/100ML; MG/100ML; MG/100ML
INJECTION, SOLUTION INTRAVENOUS
Status: DISCONTINUED | OUTPATIENT
Start: 2021-07-10 | End: 2021-07-10

## 2021-07-10 RX ORDER — BUPIVACAINE HYDROCHLORIDE 7.5 MG/ML
INJECTION, SOLUTION INTRASPINAL
Status: SHIPPED | OUTPATIENT
Start: 2021-07-10 | End: 2021-07-10

## 2021-07-10 RX ORDER — OXYCODONE AND ACETAMINOPHEN 5; 325 MG/1; MG/1
1 TABLET ORAL
Status: DISCONTINUED | OUTPATIENT
Start: 2021-07-10 | End: 2021-07-12 | Stop reason: HOSPADM

## 2021-07-10 RX ORDER — BUTORPHANOL TARTRATE 1 MG/ML
1 INJECTION INTRAMUSCULAR; INTRAVENOUS
Status: COMPLETED | OUTPATIENT
Start: 2021-07-10 | End: 2021-07-10

## 2021-07-10 RX ORDER — FAMOTIDINE 10 MG/ML
INJECTION INTRAVENOUS AS NEEDED
Status: DISCONTINUED | OUTPATIENT
Start: 2021-07-10 | End: 2021-07-10 | Stop reason: HOSPADM

## 2021-07-10 RX ORDER — SIMETHICONE 80 MG
80 TABLET,CHEWABLE ORAL AS NEEDED
Status: DISCONTINUED | OUTPATIENT
Start: 2021-07-10 | End: 2021-07-12 | Stop reason: HOSPADM

## 2021-07-10 RX ORDER — MORPHINE SULFATE 0.5 MG/ML
INJECTION, SOLUTION EPIDURAL; INTRATHECAL; INTRAVENOUS
Status: SHIPPED | OUTPATIENT
Start: 2021-07-10 | End: 2021-07-10

## 2021-07-10 RX ORDER — IBUPROFEN 800 MG/1
800 TABLET ORAL EVERY 8 HOURS
Status: DISCONTINUED | OUTPATIENT
Start: 2021-07-11 | End: 2021-07-12 | Stop reason: HOSPADM

## 2021-07-10 RX ADMIN — Medication 87.3 MILLI-UNITS/MIN: at 10:45

## 2021-07-10 RX ADMIN — FAMOTIDINE 20 MG: 10 INJECTION INTRAVENOUS at 09:27

## 2021-07-10 RX ADMIN — SODIUM CHLORIDE, POTASSIUM CHLORIDE, SODIUM LACTATE AND CALCIUM CHLORIDE 125 ML/HR: 600; 310; 30; 20 INJECTION, SOLUTION INTRAVENOUS at 06:31

## 2021-07-10 RX ADMIN — KETOROLAC TROMETHAMINE 15 MG: 30 INJECTION, SOLUTION INTRAMUSCULAR; INTRAVENOUS at 19:00

## 2021-07-10 RX ADMIN — KETOROLAC TROMETHAMINE 15 MG: 30 INJECTION, SOLUTION INTRAMUSCULAR; INTRAVENOUS at 12:31

## 2021-07-10 RX ADMIN — CEFAZOLIN SODIUM 1 G: 1 INJECTION, POWDER, FOR SOLUTION INTRAMUSCULAR; INTRAVENOUS at 09:41

## 2021-07-10 RX ADMIN — METOCLOPRAMIDE HYDROCHLORIDE 10 MG: 5 INJECTION INTRAMUSCULAR; INTRAVENOUS at 09:27

## 2021-07-10 RX ADMIN — SODIUM CHLORIDE, POTASSIUM CHLORIDE, SODIUM LACTATE AND CALCIUM CHLORIDE 125 ML/HR: 600; 310; 30; 20 INJECTION, SOLUTION INTRAVENOUS at 22:50

## 2021-07-10 RX ADMIN — TERBUTALINE SULFATE 0.25 MG: 1 INJECTION, SOLUTION SUBCUTANEOUS at 03:17

## 2021-07-10 RX ADMIN — BUTORPHANOL TARTRATE 1 MG: 1 INJECTION, SOLUTION INTRAMUSCULAR; INTRAVENOUS at 08:17

## 2021-07-10 RX ADMIN — TERBUTALINE SULFATE 0.25 MG: 1 INJECTION, SOLUTION SUBCUTANEOUS at 08:17

## 2021-07-10 RX ADMIN — MORPHINE SULFATE 0.25 MG: 0.5 INJECTION, SOLUTION EPIDURAL; INTRATHECAL; INTRAVENOUS at 09:27

## 2021-07-10 RX ADMIN — DEXAMETHASONE SODIUM PHOSPHATE 4 MG: 4 INJECTION, SOLUTION INTRA-ARTICULAR; INTRALESIONAL; INTRAMUSCULAR; INTRAVENOUS; SOFT TISSUE at 09:27

## 2021-07-10 RX ADMIN — ONDANSETRON 4 MG: 2 INJECTION INTRAMUSCULAR; INTRAVENOUS at 09:27

## 2021-07-10 RX ADMIN — PHENYLEPHRINE HYDROCHLORIDE 50 MCG/MIN: 10 INJECTION INTRAVENOUS at 09:27

## 2021-07-10 RX ADMIN — Medication 500 ML: at 09:47

## 2021-07-10 RX ADMIN — BUPIVACAINE HYDROCHLORIDE 13 MG: 7.5 INJECTION, SOLUTION INTRASPINAL at 09:27

## 2021-07-10 RX ADMIN — CEFAZOLIN SODIUM 1 G: 1 INJECTION, POWDER, FOR SOLUTION INTRAMUSCULAR; INTRAVENOUS at 09:48

## 2021-07-10 NOTE — PROGRESS NOTES
0310-dr munguia called. md aware that patient is ruptured. Clear fluid, moderate amount. MD aware of blood clot noted on pad. Orders taken for brethine.

## 2021-07-10 NOTE — PROGRESS NOTES
0815: The writer placed a call to [place] [facility] [place] [facility] and informed her of the order to Type and Hold 2 units of PRBC.

## 2021-07-10 NOTE — PROGRESS NOTES
Problem: Patient Education: Go to Patient Education Activity  Goal: Patient/Family Education  Outcome: Progressing Towards Goal     Problem: Vaginal Delivery: Day of Deliver-Laboring  Goal: Activity/Safety  Outcome: Progressing Towards Goal  Goal: Consults, if ordered  Outcome: Progressing Towards Goal  Goal: Diagnostic Test/Procedures  Outcome: Progressing Towards Goal  Goal: Nutrition/Diet  Outcome: Progressing Towards Goal  Goal: Discharge Planning  Outcome: Progressing Towards Goal  Goal: Medications  Outcome: Progressing Towards Goal  Goal: Respiratory  Outcome: Progressing Towards Goal  Goal: Treatments/Interventions/Procedures  Outcome: Progressing Towards Goal  Goal: *Vital signs within defined limits  Outcome: Progressing Towards Goal  Goal: *Labs within defined limits  Outcome: Progressing Towards Goal  Goal: *Hemodynamically stable  Outcome: Progressing Towards Goal  Goal: *Optimal pain control at patient's stated goal  Outcome: Progressing Towards Goal     Problem: Patient Education: Go to Patient Education Activity  Goal: Patient/Family Education  Outcome: Progressing Towards Goal     Problem:  Delivery: Day of Delivery  Goal: Activity/Safety  Outcome: Progressing Towards Goal  Goal: Consults, if ordered  Outcome: Progressing Towards Goal  Goal: Diagnostic Test/Procedures  Outcome: Progressing Towards Goal  Goal: Nutrition/Diet  Outcome: Progressing Towards Goal  Goal: Discharge Planning  Outcome: Progressing Towards Goal  Goal: Medications  Outcome: Progressing Towards Goal  Goal: Respiratory  Outcome: Progressing Towards Goal  Goal: Treatments/Interventions/Procedures  Outcome: Progressing Towards Goal  Goal: Psychosocial  Outcome: Progressing Towards Goal  Goal: *Vital signs within defined limits  Outcome: Progressing Towards Goal  Goal: *Labs within defined limits  Outcome: Progressing Towards Goal  Goal: *Hemodynamically stable  Outcome: Progressing Towards Goal  Goal: *Optimal pain control at patient's stated goal  Outcome: Progressing Towards Goal  Goal: *Participates in infant care  Outcome: Progressing Towards Goal  Goal: *Demonstrates progressive activity  Outcome: Progressing Towards Goal  Goal: *Tolerating diet  Outcome: Progressing Towards Goal

## 2021-07-10 NOTE — ANESTHESIA PROCEDURE NOTES
Spinal Block    Start time: 7/10/2021 9:20 AM  End time: 7/10/2021 9:27 AM  Performed by: Nissa Lance MD  Authorized by: Nissa Lance MD     Pre-procedure:   Indications: primary anesthetic  Preanesthetic Checklist: patient identified, risks and benefits discussed, anesthesia consent, site marked, patient being monitored and timeout performed    Timeout Time: 09:27 EDT          Spinal Block:   Patient Position:  Seated  Prep Region:  Lumbar  Prep: Betadine      Location:  L3-4  Technique:  Single shot        Needle:   Needle Type:  Pencan  Needle Gauge:  25 G  Attempts:  1      Events: CSF confirmed, no blood with aspiration and no paresthesia        Assessment:  Insertion:  Uncomplicated  Patient tolerance:  Patient tolerated the procedure well with no immediate complications

## 2021-07-10 NOTE — PROGRESS NOTES
0710-Bedside SBAR received at this time from TIRSO Arroyo RN.     0715-EFM adjusted,  BPM. Abdomen soft to palpation. Pt resting L lateral with pillows supporting. Pt denies questions or needs at this time, call light and personal items within reach. Pt reports positive fetal movement, and pain \"only when the contractions come\" pt denies desire for pain medication at this time. 0800-This nurse to room, pt c/o ctx pain as 8 on numeric scale, requesting pain medication at this time. This nurse informed pt and FOB that she would speak to physician on call. FOB at bedside and supportive. 0805-SBAR to Dr. Suzie Zhu at this time. Orders received, MD states she is on her way to unit. CRNA on call notified. 0817-Stadol and Terbutaline given per MD order, educated pt and FOB on medication use and side effects, state verbal understanding. Pt coping well with ctx pain, FOB supportive at bedside. 0830-Pt resting in bed, states relief from ctx pain w/ medication, plan of care discussed with pt and FOB r/t c/s, state verbal understanding. Call light and personal items within reach, jewelery removed by pt and given to FOB. Pt denies additional questions or needs at this time. 0900-EFM adjusted,  BPM, abdomen soft to palpation, pt denies needs or questions at this time. FOB at bedside and supportive. Pt continues to state relief from ctx pain w/ medication. 18-EFM removed at this time for pt transport to OR for scheduled c/s. Pt transfer to OR via bed in stable condition, FOB at bedside. 0922-In to OR at this time pt ambulatory. 27-Fetal heart tones obtained via doppler at this time, 144 BPM.     1032-Pt to room at this time from OR via bed. Pt in stable condition, report received from CRNA. Pt denies needs or questions at this time, personal items and call light within reach. 1145-This nurse to bedside, pt resting in bed holding  skin to skin.  Personal items and call light within reach, FOB at bedside and supportive. 1245-Pt resting in bed, call light and personal items within reach, popsicle given per pt request. FOB completing skin to skin with  at bedside,  in stable condition. No additional questions or needs at this time. 1310-Pt transferred to [facility] unit room 324 via bed in stable condition, personal items with FOB. Report given to ANDRÉS Lew RN at this time.

## 2021-07-10 NOTE — OP NOTES
Section Delivery Procedure Note         Name: Amaya Salazar [facility]      Medical Record Number: [id number]      YOB: 1990     Today's Date: July 10, 2021      Preoperative Diagnosis: Placenta Previa    Postoperative Diagnosis: same as above    Procedure: Low Cervical Transverse Procedure(s):   SECTION    Surgeon(s):  Lea Sosa MD    Anesthesia: Spinal    Prophylactic Antibiotics: Ancef     EBL 600cc    Clear urine at end of procedure    Fetal Description: esqueda female    Birth Information:   Information for the patient's :  Dusty Tamayo [[id number]]          Umbilical Cord: Nuchal Cord x  1    Placenta:  expressed    Specimens: placenta and cord            Complications:  none    Procedure Details: The patient was taken to the operating room, where spinal anesthesia was administered and found to be adequate. Frey catheter had been placed using sterile technique. The patient was prepped and draped in the normal sterile fashion. The abdomen was entered using the Pfannenstiel technique. The peritoneum was entered sharply well superior to the bladder. The bladder blade was then inserted. The vesicouterine and peritoneum was identified and entered sharply with Metzenbaum scissors. The bladder flap was then created sharply and the bladder blade was reinserted. A low transverse uterine incision was made with the scalpel and extended laterally with blunt finger dissection. Amniotomy was performed and the fluid was small amount clear. The babys head was then delivered atraumatically. The nose and mouth were suctioned. The cord was clamped and cut and the baby was handed off to the waiting  care unit staff. Placenta was then expressed from the uterus. The uterus was exteriorized and cleared of all clots and debris. The uterine incision was closed with number 1 Chromic in running locking fashion with good hemostasis assured.  The posterior cul-de-sac was irrigated with warm normal saline. Good hemostasis was again reassured and the uterus was returned to the abdomen. The anterior pelvis was irrigated with warm normal saline and good hemostasis was again reassured throughout. The rectus muscles were reapproximated in the midline with a series of simple stitches with 0 chromic. The fascia was closed with 0 Vicryl in a running fashion. Good hemostasis was assured. The subcuticular layers were reapproximated with 2-0 plain gut in interrupted fashion. The skin was closed with a 4-0 Vicryl in a subcuticular fashion. Dermabond was applied. The patient tolerated the procedure well. Sponge, lap, and needle counts were correct times three and the patient and baby were taken to recovery/postpartum room in stable condition.     Signed: Jana Carey MD      July 10, 2021

## 2021-07-10 NOTE — PROGRESS NOTES
1305 Transferred from L&D. Room orientation completed. Hourly rounding and bedside shift report discussed with patient. New medication side effect sheet reviewed. Understanding Mother & Baby Care booklet given. Post-birth warning signs sheet and local community resource sheet given.  Instructed to call nurse first time ambulating       Problem:  Delivery: Day of Delivery  Goal: Nutrition/Diet  Outcome: Progressing Towards Goal  Goal: Medications  Outcome: Progressing Towards Goal  Goal: Respiratory  Outcome: Progressing Towards Goal  Goal: Treatments/Interventions/Procedures  Outcome: Progressing Towards Goal  Goal: Psychosocial  Outcome: Progressing Towards Goal  Goal: *Vital signs within defined limits  Outcome: Progressing Towards Goal  Goal: *Labs within defined limits  Outcome: Progressing Towards Goal  Goal: *Optimal pain control at patient's stated goal  Outcome: Progressing Towards Goal  Goal: *Participates in infant care  Outcome: Progressing Towards Goal  Goal: *Demonstrates progressive activity  Outcome: Progressing Towards Goal

## 2021-07-10 NOTE — ANESTHESIA POSTPROCEDURE EVALUATION
Procedure(s):   SECTION. spinal    Anesthesia Post Evaluation      Multimodal analgesia: multimodal analgesia used between 6 hours prior to anesthesia start to PACU discharge  Patient location during evaluation: floor (Labor and Delivery Unit)  Patient participation: complete - patient participated  Level of consciousness: awake  Pain score: 0  Pain management: adequate  Airway patency: patent  Anesthetic complications: no  Cardiovascular status: acceptable  Respiratory status: acceptable  Hydration status: acceptable  Comments: The patient was transferred from the obstetric operating room to the L&D patient room. The patient was hemodynamically stable. Handoff was given to the nursing staff. All pre-, intra-, and postoperative questions were answered. Post anesthesia nausea and vomiting:  none  Final Post Anesthesia Temperature Assessment:  Normothermia (36.0-37.5 degrees C)      INITIAL Post-op Vital signs:   Vitals Value Taken Time   /76 07/10/21 1214   Temp 36.7 °C (98 °F) 07/10/21 1200   Pulse 65 07/10/21 1214   Resp 16 07/10/21 1200   SpO2 98 % 07/10/21 1221   Vitals shown include unvalidated device data.

## 2021-07-11 LAB
BACTERIA SPEC CULT: NORMAL
BASOPHILS # BLD: 0 K/UL (ref 0–0.1)
BASOPHILS NFR BLD: 0 % (ref 0–1)
DIFFERENTIAL METHOD BLD: ABNORMAL
EOSINOPHIL # BLD: 0.1 K/UL (ref 0–0.4)
EOSINOPHIL NFR BLD: 1 % (ref 0–7)
ERYTHROCYTE [DISTWIDTH] IN BLOOD BY AUTOMATED COUNT: 12.3 % (ref 11.5–14.5)
HCT VFR BLD AUTO: 29.6 % (ref 35–47)
HGB BLD-MCNC: 10.1 G/DL (ref 11.5–16)
IMM GRANULOCYTES # BLD AUTO: 0.1 K/UL (ref 0–0.04)
IMM GRANULOCYTES NFR BLD AUTO: 1 % (ref 0–0.5)
LYMPHOCYTES # BLD: 2.1 K/UL (ref 0.8–3.5)
LYMPHOCYTES NFR BLD: 23 % (ref 12–49)
MCH RBC QN AUTO: 32.1 PG (ref 26–34)
MCHC RBC AUTO-ENTMCNC: 34.1 G/DL (ref 30–36.5)
MCV RBC AUTO: 94 FL (ref 80–99)
MONOCYTES # BLD: 0.7 K/UL (ref 0–1)
MONOCYTES NFR BLD: 7 % (ref 5–13)
NEUTS SEG # BLD: 6.4 K/UL (ref 1.8–8)
NEUTS SEG NFR BLD: 68 % (ref 32–75)
NRBC # BLD: 0 K/UL (ref 0–0.01)
NRBC BLD-RTO: 0 PER 100 WBC
PLATELET # BLD AUTO: 167 K/UL (ref 150–400)
PMV BLD AUTO: 9.5 FL (ref 8.9–12.9)
RBC # BLD AUTO: 3.15 M/UL (ref 3.8–5.2)
SPECIAL REQUESTS,SREQ: NORMAL
WBC # BLD AUTO: 9.2 K/UL (ref 3.6–11)

## 2021-07-11 PROCEDURE — 85025 COMPLETE CBC W/AUTO DIFF WBC: CPT

## 2021-07-11 PROCEDURE — 65410000002 HC RM PRIVATE OB

## 2021-07-11 PROCEDURE — 74011250637 HC RX REV CODE- 250/637: Performed by: OBSTETRICS & GYNECOLOGY

## 2021-07-11 PROCEDURE — 74011250636 HC RX REV CODE- 250/636: Performed by: ANESTHESIOLOGY

## 2021-07-11 PROCEDURE — 36415 COLL VENOUS BLD VENIPUNCTURE: CPT

## 2021-07-11 RX ADMIN — KETOROLAC TROMETHAMINE 15 MG: 30 INJECTION, SOLUTION INTRAMUSCULAR; INTRAVENOUS at 01:40

## 2021-07-11 RX ADMIN — SIMETHICONE 80 MG: 80 TABLET, CHEWABLE ORAL at 15:32

## 2021-07-11 RX ADMIN — SIMETHICONE 80 MG: 80 TABLET, CHEWABLE ORAL at 11:23

## 2021-07-11 RX ADMIN — OXYCODONE HYDROCHLORIDE AND ACETAMINOPHEN 1 TABLET: 5; 325 TABLET ORAL at 04:55

## 2021-07-11 RX ADMIN — OXYCODONE HYDROCHLORIDE AND ACETAMINOPHEN 1 TABLET: 5; 325 TABLET ORAL at 11:35

## 2021-07-11 RX ADMIN — IBUPROFEN 800 MG: 800 TABLET, FILM COATED ORAL at 15:32

## 2021-07-11 RX ADMIN — OXYCODONE HYDROCHLORIDE AND ACETAMINOPHEN 1 TABLET: 5; 325 TABLET ORAL at 15:32

## 2021-07-11 RX ADMIN — SIMETHICONE 80 MG: 80 TABLET, CHEWABLE ORAL at 21:36

## 2021-07-11 RX ADMIN — IBUPROFEN 800 MG: 800 TABLET, FILM COATED ORAL at 22:41

## 2021-07-11 NOTE — PROGRESS NOTES
Progress Note                               Patient: Cristy Freeman MRN: [id number]  SSN: [id number]    YOB: 1990  Age: 31 y.o. Sex: female      1 Day Post-Op     Subjective:     Patient doing well postpartum without significant complaints. Voiding without difficulty. Patient reports normal lochia. . Breastfeeding: Yes     Objective:     Patient Vitals for the past 18 hrs:   Temp Pulse Resp BP SpO2   21 0814 98 °F (36.7 °C) 83 16 109/70 95 %   21 0325 98.1 °F (36.7 °C) 69 18 113/62 96 %   07/10/21 2340 98.3 °F (36.8 °C) 70 16 100/66 98 %   07/10/21 2017 98 °F (36.7 °C) 73 18 112/70 97 %   07/10/21 1800   18  100 %       Temp (24hrs), Av.3 °F (36.8 °C), Min:98 °F (36.7 °C), Max:98.8 °F (37.1 °C)      Physical Exam:    General:   Patient without distress. Abdomen: Soft, fundus firm at level of umbilicus, nontender   Incision: Clean, dry, and intact without erythema   Lower Extremities: Negative for swelling, cords, or tenderness        Lab/Data Review: All lab results for the last 24 hours reviewed. Assessment and Plan:     Patient appears to be having an uncomplicated post- course. Continue routine postoperative care and maternal education.

## 2021-07-12 VITALS
OXYGEN SATURATION: 97 % | RESPIRATION RATE: 18 BRPM | SYSTOLIC BLOOD PRESSURE: 139 MMHG | DIASTOLIC BLOOD PRESSURE: 85 MMHG | BODY MASS INDEX: 26.46 KG/M2 | TEMPERATURE: 98.3 F | WEIGHT: 155 LBS | HEIGHT: 64 IN | HEART RATE: 70 BPM

## 2021-07-12 LAB
ABO + RH BLD: NORMAL
BLD PROD TYP BPU: NORMAL
BLD PROD TYP BPU: NORMAL
BLOOD GROUP ANTIBODIES SERPL: NEGATIVE
BPU ID: NORMAL
BPU ID: NORMAL
CROSSMATCH RESULT,%XM: NORMAL
CROSSMATCH RESULT,%XM: NORMAL
SPECIMEN EXP DATE BLD: NORMAL
STATUS OF UNIT,%ST: NORMAL
STATUS OF UNIT,%ST: NORMAL
TRANSFUSION STATUS PATIENT QL: NORMAL
TRANSFUSION STATUS PATIENT QL: NORMAL
UNIT DIVISION, %UDIV: 0
UNIT DIVISION, %UDIV: 0

## 2021-07-12 PROCEDURE — 74011250637 HC RX REV CODE- 250/637: Performed by: OBSTETRICS & GYNECOLOGY

## 2021-07-12 RX ADMIN — IBUPROFEN 800 MG: 800 TABLET, FILM COATED ORAL at 06:18

## 2021-07-12 RX ADMIN — Medication: at 09:00

## 2021-07-12 NOTE — PROGRESS NOTES
Post-Operative  Day 2    [address] [facility]     Information for the patient's :  Ivette Aase [[id number]]   , Low Transverse    Patient doing well without significant complaint. Tolerating diet, passing flatus, voiding and ambulating without difficulty    Vitals:    Visit Vitals  /85 (BP 1 Location: Left upper arm, BP Patient Position: Sitting)   Pulse 70   Temp 98.3 °F (36.8 °C)   Resp 18   Ht 5' 4\" (1.626 m)   Wt 70.3 kg (155 lb)   SpO2 97%   Breastfeeding Unknown   BMI 26.61 kg/m²     Temp (24hrs), Av.2 °F (36.8 °C), Min:97.9 °F (36.6 °C), Max:98.5 °F (36.9 °C)        Exam:        Patient without distress. Abdomen, bowel sounds present, soft, expected tenderness, fundus firm                Wound incision clean, dry and intact               Lower extremities are negative for swelling, cords or tenderness. Labs:   Lab Results   Component Value Date/Time    WBC 9.2 2021 06:55 AM    WBC 9.7 2021 06:15 PM    HGB 10.1 (L) 2021 06:55 AM    HGB 13.2 2021 06:15 PM    HCT 29.6 (L) 2021 06:55 AM    HCT 39.4 2021 06:15 PM    PLATELET 284  06:55 AM    PLATELET 087  06:15 PM       No results found for this or any previous visit (from the past 24 hour(s)). Assessment: Post-Op day 2, doing well  S/p c/s      Plan:   1. Discharge home today  2. Follow up in office in 6 weeks with Brijesh Bear MD  3. Post partum activity advised, diet as tolerated  4.  Discharge Medications: ibuprofen, percocet and medications prior to admission

## 2021-07-12 NOTE — DISCHARGE SUMMARY
Name: Abelardo Myers MRN: [id number]  SSN: [id number]    YOB: 1990  Age: 31 y.o. Sex: female      Admit Date: 2021    Discharge Date: 2021     Admitting Physician: Nicol Mancia MD     Attending Physician:  Bell Garza MD     Admission Diagnoses: Pregnancy [Z34.90]    Discharge Diagnoses:   Information for the patient's :  Jeff Lance [[id number]]   Delivery of a 3.16 kg female infant via , Low Transverse on 7/10/2021 at 9:46 AM  by Seda Herrera. Apgars were 8  and 9 . Additional Diagnoses:   Hospital Problems  Date Reviewed: 2021        Codes Class Noted POA    Pregnancy ICD-10-CM: Z34.90  ICD-9-CM: V22.2  2021 Unknown        38 weeks gestation of pregnancy ICD-10-CM: Z3A.38  ICD-9-CM: V22.2  2021 Unknown             Lab Results   Component Value Date/Time    ABO/Rh(D) O Positive 2021 10:13 PM       Immunization(s): There is no immunization history for the selected administration types on file for this patient. Hospital Course: Normal hospital course following the delivery. Patient Instructions:   Current Discharge Medication List      START taking these medications    Details   ibuprofen (MOTRIN) 800 mg tablet Take 1 Tablet by mouth every eight (8) hours as needed for Pain. Qty: 40 Tablet, Refills: 0      oxyCODONE-acetaminophen (PERCOCET) 5-325 mg per tablet Take 1 Tablet by mouth every eight (8) hours as needed for Pain for up to 14 days. Max Daily Amount: 3 Tablets. Qty: 30 Tablet, Refills: 0    Associated Diagnoses: Post-op pain         CONTINUE these medications which have NOT CHANGED    Details   prenatal vit37/iron/folic acid (PRENATA PO) Take  by mouth. AMBULATORY BREAST PUMP 1 unit  Qty: 1 Units, Refills: 0             Reference my discharge instructions.     Follow-up Appointments   Procedures    FOLLOW UP VISIT Appointment in: One Week Incision check     Incision check Standing Status:   Standing     Number of Occurrences:   1     Order Specific Question:   Appointment in     Answer: One Week        Signed By:  Abena Guallpa MD     July 12, 2021      \"[id number]\"     I spent 30 minutes or less with the patient to perform a final examination, discuss the hospital stay, give instructions for continuing care to all relevant caregivers and prepare discharge records, prescriptions and referral forms.

## 2021-07-12 NOTE — PROGRESS NOTES
1130: Discharge plan of care/case management plan validated with provider discharge order. Discharge instructions reviewed. Rx for percocet and motrin sent to pharmacy on file. Patient aware purpose and side effects of meds. Patient aware when and how to call md after discharge. Patient aware follow up date, time and location. Patient denies history of depression or post partum depression. Aware signs and symptoms of depression to call md regarding after discharge. No questions. States understanding. 1215: Patient discharged, requesting to ambulate off unit. Steady gait. Baby in carseat.  Belongings with patient

## 2021-07-19 ENCOUNTER — OFFICE VISIT (OUTPATIENT)
Dept: OBGYN CLINIC | Age: 31
End: 2021-07-19
Payer: OTHER GOVERNMENT

## 2021-07-19 VITALS — WEIGHT: 135 LBS | BODY MASS INDEX: 23.05 KG/M2 | HEIGHT: 64 IN

## 2021-07-19 DIAGNOSIS — Z09 POSTOP CHECK: Primary | ICD-10-CM

## 2021-07-19 PROCEDURE — 0503F POSTPARTUM CARE VISIT: CPT | Performed by: OBSTETRICS & GYNECOLOGY

## 2021-07-19 NOTE — PROGRESS NOTES
Chief Complaint   Patient presents with    Wound Check     s/p  7-10-21     Visit Vitals  Ht 5' 4\" (1.626 m)   Wt 135 lb (61.2 kg)   Breastfeeding Yes   BMI 23.17 kg/m²

## 2021-07-19 NOTE — PROGRESS NOTES
Richard Durham is a , 31 y.o. female   No LMP recorded. She presents for her post-op    She is having no significant problems. Menstrual status:  Cycles are postpartum. Flow: postpartum. She does not have dysmenorrhea. Medical conditions:  Since her last annual GYN exam about one year ago, she has not the following changes in her health history: none. Mammogram History:    GRIS Results (most recent):  No results found for this or any previous visit. DEXA Results (most recent):  No results found for this or any previous visit. Past Medical History:   Diagnosis Date    History of lump of right breast 2020     Past Surgical History:   Procedure Laterality Date    HX  SECTION  07/10/2021       Prior to Admission medications    Medication Sig Start Date End Date Taking? Authorizing Provider   ibuprofen (MOTRIN) 800 mg tablet Take 1 Tablet by mouth every eight (8) hours as needed for Pain. 7/10/21  Yes Shruthi Palma MD   prenatal vit37/iron/folic acid (PRENATA PO) Take  by mouth. Yes Provider, Historical   AMBULATORY BREAST PUMP 1 unit 21  Yes Adrien Alexander MD       No Known Allergies       Tobacco History:  reports that she has never smoked. She has never used smokeless tobacco.  Alcohol Abuse:  reports previous alcohol use. Drug Abuse:  reports no history of drug use. Family Medical/Cancer History:   Family History   Problem Relation Age of Onset    Breast Cancer Mother           Review of Systems   Constitutional: Negative for chills, fever, malaise/fatigue and weight loss. HENT: Negative for congestion, ear pain, sinus pain and tinnitus. Eyes: Negative for blurred vision and double vision. Respiratory: Negative for cough, shortness of breath and wheezing. Cardiovascular: Negative for chest pain and palpitations.    Gastrointestinal: Negative for abdominal pain, blood in stool, constipation, diarrhea, heartburn, nausea and vomiting. Genitourinary: Negative for dysuria, flank pain, frequency, hematuria and urgency. Musculoskeletal: Negative for joint pain and myalgias. Skin: Negative for itching and rash. Neurological: Negative for dizziness, weakness and headaches. Psychiatric/Behavioral: Negative for depression, memory loss and suicidal ideas. The patient is not nervous/anxious and does not have insomnia. Physical Exam  Constitutional:       Appearance: Normal appearance. HENT:      Head: Normocephalic and atraumatic. Abdominal:      General: Abdomen is flat. Palpations: Abdomen is soft. Comments: Incision healing nicely, no signs of infection   Neurological:      Mental Status: She is alert. Psychiatric:         Mood and Affect: Mood normal.         Behavior: Behavior normal.         Thought Content: Thought content normal.          Visit Vitals  Ht 5' 4\" (1.626 m)   Wt 135 lb (61.2 kg)   Breastfeeding Yes   BMI 23.17 kg/m²         Assessment:   Diagnoses and all orders for this visit:    1. Postop check        Plan: Questions addressed  Counseled re: diet, exercise, healthy lifestyle        Follow-up and Dispositions    · Return in about 5 weeks (around 8/23/2021) for Post Partum.

## 2021-08-24 ENCOUNTER — OFFICE VISIT (OUTPATIENT)
Dept: OBGYN CLINIC | Age: 31
End: 2021-08-24
Payer: OTHER GOVERNMENT

## 2021-08-24 VITALS
WEIGHT: 130.13 LBS | SYSTOLIC BLOOD PRESSURE: 106 MMHG | HEIGHT: 64 IN | DIASTOLIC BLOOD PRESSURE: 62 MMHG | BODY MASS INDEX: 22.22 KG/M2

## 2021-08-24 DIAGNOSIS — Z12.4 SCREENING FOR MALIGNANT NEOPLASM OF CERVIX: Primary | ICD-10-CM

## 2021-08-24 PROCEDURE — 0503F POSTPARTUM CARE VISIT: CPT | Performed by: OBSTETRICS & GYNECOLOGY

## 2021-08-24 NOTE — PROGRESS NOTES
Chief Complaint   Patient presents with   [address]     s/p  7-10-21     Visit Vitals  /62 (BP 1 Location: Left upper arm, BP Patient Position: Sitting, BP Cuff Size: Small adult)   Ht 5' 4\" (1.626 m)   Wt 130 lb 2 oz (59 kg)   LMP 2021   Breastfeeding Yes   BMI 22.34 kg/m²

## 2021-08-24 NOTE — PROGRESS NOTES
Mark Juarez is a , 31 y.o. female   Patient's last menstrual period was 2021. She presents for her post-partum    She is having no significant problems. Menstrual status:  Cycles are postpartum. Flow: postpartum. She does not have dysmenorrhea. Medical conditions:  Since her last annual GYN exam about one year ago, she has not the following changes in her health history: none. Mammogram History:    GRIS Results (most recent):  No results found for this or any previous visit. DEXA Results (most recent):  No results found for this or any previous visit. Past Medical History:   Diagnosis Date    History of lump of right breast 2020     Past Surgical History:   Procedure Laterality Date    HX  SECTION  07/10/2021       Prior to Admission medications    Medication Sig Start Date End Date Taking? Authorizing Provider   AMBULATORY BREAST PUMP 1 unit 21  Yes Prisca Escoto MD   prenatal vit37/iron/folic acid (PRENATA PO) Take  by mouth. Provider, Historical       No Known Allergies       Tobacco History:  reports that she has never smoked. She has never used smokeless tobacco.  Alcohol Abuse:  reports previous alcohol use. Drug Abuse:  reports no history of drug use. Family Medical/Cancer History:   Family History   Problem Relation Age of Onset    Breast Cancer Mother           Review of Systems   Constitutional: Negative for chills, fever, malaise/fatigue and weight loss. HENT: Negative for congestion, ear pain, sinus pain and tinnitus. Eyes: Negative for blurred vision and double vision. Respiratory: Negative for cough, shortness of breath and wheezing. Cardiovascular: Negative for chest pain and palpitations. Gastrointestinal: Negative for abdominal pain, blood in stool, constipation, diarrhea, heartburn, nausea and vomiting. Genitourinary: Negative for dysuria, flank pain, frequency, hematuria and urgency. Musculoskeletal: Negative for joint pain and myalgias. Skin: Negative for itching and rash. Neurological: Negative for dizziness, weakness and headaches. Psychiatric/Behavioral: Negative for depression, memory loss and suicidal ideas. The patient is not nervous/anxious and does not have insomnia. Physical Exam  Constitutional:       Appearance: Normal appearance. HENT:      Head: Normocephalic and atraumatic. Abdominal:      General: Abdomen is flat. Palpations: Abdomen is soft. Genitourinary:     General: Normal vulva. Vagina: Normal.      Cervix: Normal.      Uterus: Normal.       Adnexa: Right adnexa normal and left adnexa normal.      Rectum: Normal.      Comments: PAP Obtained  Neurological:      Mental Status: She is alert. Psychiatric:         Mood and Affect: Mood normal.         Behavior: Behavior normal.         Thought Content: Thought content normal.          Visit Vitals  /62 (BP 1 Location: Left upper arm, BP Patient Position: Sitting, BP Cuff Size: Small adult)   Ht 5' 4\" (1.626 m)   Wt 130 lb 2 oz (59 kg)   LMP 08/24/2021   Breastfeeding Yes   BMI 22.34 kg/m²         Assessment: Diagnoses and all orders for this visit:    1. Screening for malignant neoplasm of cervix  -     PAP IG, RFX APTIMA HPV ASCUS ([id number])    2.  Routine postpartum follow-up  -     PAP IG, RFX APTIMA HPV ASCUS ([id number])    BC options to suppress menses DWP- desires to try Nexplanon    Plan: Questions addressed  Counseled re: diet, exercise, healthy lifestyle  Return for Annual

## 2021-08-25 LAB
CYTOLOGIST CVX/VAG CYTO: NORMAL
CYTOLOGY CVX/VAG DOC CYTO: NORMAL
CYTOLOGY CVX/VAG DOC THIN PREP: NORMAL
DX ICD CODE: NORMAL
LABCORP, 190119: NORMAL
Lab: NORMAL
OTHER STN SPEC: NORMAL
STAT OF ADQ CVX/VAG CYTO-IMP: NORMAL

## 2021-10-01 ENCOUNTER — OFFICE VISIT (OUTPATIENT)
Dept: OBGYN CLINIC | Age: 31
End: 2021-10-01
Payer: OTHER GOVERNMENT

## 2021-10-01 VITALS
DIASTOLIC BLOOD PRESSURE: 62 MMHG | BODY MASS INDEX: 22.22 KG/M2 | HEIGHT: 64 IN | SYSTOLIC BLOOD PRESSURE: 106 MMHG | WEIGHT: 130.13 LBS

## 2021-10-01 DIAGNOSIS — Z30.017 NEXPLANON INSERTION: ICD-10-CM

## 2021-10-01 DIAGNOSIS — N94.89 MENSTRUAL SUPPRESSION: Primary | ICD-10-CM

## 2021-10-01 PROCEDURE — 11981 INSERTION DRUG DLVR IMPLANT: CPT | Performed by: OBSTETRICS & GYNECOLOGY

## 2021-10-01 NOTE — PROGRESS NOTES
Procedure note: Nexplanon insertion    Angel Savage is a G3 [id number],  31 y.o. female [address] [facility] whose Patient's last menstrual period was 09/30/2021 (exact date). was on 9/30/2021 presents for office insertion of an [address] sub-dermal contraceptive implant. She has had an opportunity to read the [address] \"Patient Labeling and Consent Form\". We counseled Oriana about the insertion and removal procedures as well as potential side-effects, benefits and risks. She state she had no further questions and signed the consent form. She has been using none to the present time. She confirmed that she has no allergies to Betadine or Xylocaine. She reclined on the examination table in the supine position with her left arm flexed at the elbow and externally rotated. The insertion site was identified and marked approximately 6-8 cm proximal to the elbow crease at the inner side of the upper arm over the triceps muscle below the groove between the biceps and triceps muscles. A second grupo was placed 6-8 cm above the first grupo. The insertion site was cleansed with Betadine antiseptic. Approximately 5 ml of 2% xylocaine without epinephrine were injected just under the skin along the planned insertion canal. The NEXPLANON sterile applicator was carefully removed from its blister pack and kept sterile. I removed the needle cap. I visually verified the presence of NEXPLANON inside the needle tip. The skin at the insertion site was then stretched by my thumb and index finger. I then inserted the needle tip through the skin at the appropriate angle to the skin surface, just until the skin has been penetrated. The needle was gently inserted to its full length. The slider was then pushed all the way and then released. I then removed the needle and palpated the implant in the appropriate location. The patient also palpated the implant in place.  Both Oriana and I were able to confirm the presence of the [address] in its subdermal location by palpation. The skin was cleansed and dried. A small adhesive bandage was placed over the insertion site. There were no complication or problems. She demonstrated full active range of movement of her elbow, wrist, all five digits and denied numbness and tingling. Post-procedure:  She was told to remove the dressing in 5 days  The patient User Card and Patient Chart Label were filled in. She was given the User Card for her records. She was advised to have the [place] removed or replaced three years from today's date. Diagnoses and all orders for this visit:    1. Menstrual suppression  -     etonogestreL impl 68 mg    2. Nexplanon insertion        Plan: Questions addressed        Follow-up and Dispositions    · Return in about 4 weeks (around 10/29/2021) for Follow-up.

## 2021-10-01 NOTE — PROGRESS NOTES
Chief Complaint   Patient presents with    Nexplanon     Insertion     Visit Vitals  /62 (BP 1 Location: Left upper arm, BP Patient Position: Sitting, BP Cuff Size: Small adult)   Ht 5' 4\" (1.626 m)   Wt 130 lb 2 oz (59 kg)   LMP 09/30/2021 (Exact Date)   BMI 22.34 kg/m²

## 2021-11-05 ENCOUNTER — OFFICE VISIT (OUTPATIENT)
Dept: OBGYN CLINIC | Age: 31
End: 2021-11-05
Payer: OTHER GOVERNMENT

## 2021-11-05 VITALS — WEIGHT: 134 LBS | BODY MASS INDEX: 22.88 KG/M2 | HEIGHT: 64 IN

## 2021-11-05 DIAGNOSIS — Z30.46 ENCOUNTER FOR SURVEILLANCE OF NEXPLANON SUBDERMAL CONTRACEPTIVE: Primary | ICD-10-CM

## 2021-11-05 PROCEDURE — 99213 OFFICE O/P EST LOW 20 MIN: CPT | Performed by: OBSTETRICS & GYNECOLOGY

## 2021-11-05 NOTE — PROGRESS NOTES
Gage Bauman is a , 36 y.o. female   Patient's last menstrual period was 2021 (exact date). She presents for her problem    She is having Nexplanon placed 1 mth ago- no issues, had cycle. Menstrual status:  Cycles are minimal to none with Nexplanon. Flow: light. She does not have dysmenorrhea. Medical conditions:  Since her last annual GYN exam about one year ago, she has not the following changes in her health history: none. Mammogram History:    GRIS Results (most recent):  No results found for this or any previous visit. DEXA Results (most recent):  No results found for this or any previous visit. Past Medical History:   Diagnosis Date    History of lump of right breast 2020     Past Surgical History:   Procedure Laterality Date    HX  SECTION  07/10/2021       Prior to Admission medications    Not on File       No Known Allergies       Tobacco History:  reports that she has never smoked. She has never used smokeless tobacco.  Alcohol Abuse:  reports previous alcohol use. Drug Abuse:  reports no history of drug use. Family Medical/Cancer History:   Family History   Problem Relation Age of Onset    Breast Cancer Mother           Review of Systems   Constitutional: Negative for chills, fever, malaise/fatigue and weight loss. HENT: Negative for congestion, ear pain, sinus pain and tinnitus. Eyes: Negative for blurred vision and double vision. Respiratory: Negative for cough, shortness of breath and wheezing. Cardiovascular: Negative for chest pain and palpitations. Gastrointestinal: Negative for abdominal pain, blood in stool, constipation, diarrhea, heartburn, nausea and vomiting. Genitourinary: Negative for dysuria, flank pain, frequency, hematuria and urgency. Musculoskeletal: Negative for joint pain and myalgias. Skin: Negative for itching and rash.    Neurological: Negative for dizziness, weakness and headaches. Psychiatric/Behavioral: Negative for depression, memory loss and suicidal ideas. The patient is not nervous/anxious and does not have insomnia. Physical Exam  Constitutional:       Appearance: Normal appearance. HENT:      Head: Normocephalic and atraumatic. Skin:         Neurological:      Mental Status: She is alert. Psychiatric:         Mood and Affect: Mood normal.         Behavior: Behavior normal.         Thought Content: Thought content normal.          Visit Vitals  Ht 5' 4\" (1.626 m)   Wt 134 lb (60.8 kg)   LMP 11/05/2021 (Exact Date)   BMI 23.00 kg/m²         Assessment:   Diagnoses and all orders for this visit:    1.  Encounter for surveillance of Nexplanon subdermal contraceptive        Plan: Questions addressed  Counseled re: diet, exercise, healthy lifestyle  Return for Annual        Follow-up and Dispositions    · Return in about 9 months (around 8/5/2022) for Annual.

## 2021-11-05 NOTE — PROGRESS NOTES
Chief Complaint   Patient presents with    Follow-up     10-01-21 Nexplanon inserted left arm     Visit Vitals  Ht 5' 4\" (1.626 m)   Wt 134 lb (60.8 kg)   LMP 11/05/2021 (Exact Date)   BMI 23.00 kg/m²

## 2022-03-18 PROBLEM — Z34.90 PREGNANCY: Status: ACTIVE | Noted: 2021-07-08

## 2022-03-19 PROBLEM — Z3A.38 38 WEEKS GESTATION OF PREGNANCY: Status: ACTIVE | Noted: 2021-07-08

## 2023-04-19 ENCOUNTER — OFFICE VISIT (OUTPATIENT)
Dept: SURGERY | Age: 33
End: 2023-04-19

## 2023-04-19 VITALS
WEIGHT: 127 LBS | RESPIRATION RATE: 18 BRPM | BODY MASS INDEX: 21.68 KG/M2 | HEART RATE: 98 BPM | SYSTOLIC BLOOD PRESSURE: 108 MMHG | TEMPERATURE: 98 F | OXYGEN SATURATION: 98 % | DIASTOLIC BLOOD PRESSURE: 85 MMHG | HEIGHT: 64 IN

## 2023-04-19 DIAGNOSIS — K64.8 INTERNAL HEMORRHOID, BLEEDING: Primary | ICD-10-CM

## 2023-04-19 DIAGNOSIS — K64.4 HEMORRHOIDS, EXTERNAL: ICD-10-CM

## 2023-04-19 PROCEDURE — 99202 OFFICE O/P NEW SF 15 MIN: CPT | Performed by: SURGERY

## 2023-04-19 NOTE — PROGRESS NOTES
Identified pt with two pt identifiers (name and ). Reviewed chart in preparation for visit and have obtained necessary documentation. Stephanie Boo is a 29 y.o. female  Chief Complaint   Patient presents with    New Patient     New Patient: unspecified hemorrhoids     Visit Vitals  /85 (BP 1 Location: Left upper arm, BP Patient Position: Sitting)   Pulse 98   Temp 98 °F (36.7 °C) (Temporal)   Resp 18   Ht 5' 4\" (1.626 m)   Wt 127 lb (57.6 kg)   SpO2 98%   BMI 21.80 kg/m²       1. Have you been to the ER, urgent care clinic since your last visit? Hospitalized since your last visit? No    2. Have you seen or consulted any other health care providers outside of the [address] [place] [address] since your last visit? Include any pap smears or colon screening.  No

## 2023-04-20 NOTE — PROGRESS NOTES
[facility] History and Physical    Chief Complaint: hemorrhoids    History of Present Illness:      Chata Oglesby is a 29 y.o. female who has had a several year history of hemorrhoids, which she first noticed during her first pregnancy. She has had intermittent episodes of blood per rectum since then. She has occasional constipation, but overall has fairly normal bowel movements. She has also had pain and itching in the area at times. Past Medical History:   Diagnosis Date    History of lump of right breast 2020       Past Surgical History:   Procedure Laterality Date    HX  SECTION  07/10/2021       Social History     Socioeconomic History    Marital status:      Spouse name: Kieran Kolb    Number of children: 1    Years of education: Not on file    Highest education level: Not on file   Occupational History    Not on file   Tobacco Use    Smoking status: Never    Smokeless tobacco: Never   Vaping Use    Vaping Use: Never used   Substance and Sexual Activity    Alcohol use: Not Currently    Drug use: Never    Sexual activity: Yes     Partners: Male     Birth control/protection: Implant     Comment: 10-01-21 Nexplanon inserted left arm.  Expires 10-01-24   Other Topics Concern     Service Not Asked    Blood Transfusions Not Asked    Caffeine Concern Not Asked    Occupational Exposure Not Asked    Hobby Hazards Not Asked    Sleep Concern Not Asked    Stress Concern Not Asked    Weight Concern Not Asked    Special Diet Not Asked    Back Care Not Asked    Exercise Not Asked    Bike Helmet Not Asked    Seat Belt Not Asked    Self-Exams Not Asked   Social History Narrative    Not on file     Social Determinants of Health     Financial Resource Strain: Not on file   Food Insecurity: Not on file   Transportation Needs: Not on file   Physical Activity: Not on file   Stress: Not on file   Social Connections: Not on file   Intimate Partner Violence: Not on file Housing Stability: Not on file       Family History   Problem Relation Age of Onset    Breast Cancer Mother          Current Outpatient Medications:     hydrocortisone (ANUSOL-HC) 2.5 % rectal cream, Insert  into rectum three (3) times daily. , Disp: , Rfl:     docusate sodium (COLACE) 100 mg capsule, Take 1 Capsule by mouth two (2) times a day., Disp: , Rfl:     Current Facility-Administered Medications:     etonogestreL impl 68 mg, 1 Each, SubDERmal, PRN, Lino Haney MD, 68 mg at 10/01/21 3195    No Known Allergies    ROS   Constitutional: negative  Ears, Nose, Mouth, Throat, and Face: Negative  Respiratory: Negative  Cardiovascular: Negative  Gastrointestinal: as above  Genitourinary: Negative  Integument/Breast: Negative  Hematologic/Lymphatic: Negative  Behavioral/Psychiatric: Negative  Allergic/Immunologic: Negative      Physical Exam:     Visit Vitals  /85 (BP 1 Location: Left upper arm, BP Patient Position: Sitting)   Pulse 98   Temp 98 °F (36.7 °C) (Temporal)   Resp 18   Ht 5' 4\" (1.626 m)   Wt 127 lb (57.6 kg)   SpO2 98%   BMI 21.80 kg/m²       General - alert and oriented, no apparent distress  HEENT - NC/AT. No scleral icterus  Pulm - CTAB, normal inspiratory effort  CV - RRR, no M/R/G  Abd - soft, NT, ND. Rectal - small external hemorrhoid/skin tag, good tone, no masses, small internal hemorrhoid without bleeding or significant thrombosis on anoscopy  Ext - warm, well perfused, no edema  Skin - supple, no rashes  Psychiatric - normal affect, good mood        Assessment:     Jaime Noland is a 29 y.o. female with small bowel internal and external hemorrhoids. The external hemorrhoid is resolving to a skin tag. Recommendations:     1. I think we can observe and treat medically for now. I recommended that she should stay well-hydrated, take fiber supplementation and to avoid constipation by using softeners or laxatives as needed.   If she continues to have problems, we can consider hemorrhoidectomy. The internal hemorrhoids are likely the to the band ligation. I will see her back in a month. 25 mins of time was spent with the patient of which > 50% of the time involved face-to-face counseling of the patient regarding the proposed treatment plan.       Edward Dobbs MD  4/20/2023    CC: Sydney Merchant NP

## 2023-05-14 RX ORDER — PSEUDOEPHEDRINE HCL 30 MG
100 TABLET ORAL 2 TIMES DAILY
COMMUNITY

## 2023-05-22 ENCOUNTER — TELEPHONE (OUTPATIENT)
Age: 33
End: 2023-05-22

## 2023-05-23 ENCOUNTER — TELEPHONE (OUTPATIENT)
Age: 33
End: 2023-05-23

## 2023-05-23 ENCOUNTER — OFFICE VISIT (OUTPATIENT)
Age: 33
End: 2023-05-23
Payer: OTHER GOVERNMENT

## 2023-05-23 VITALS
WEIGHT: 126 LBS | HEIGHT: 64 IN | OXYGEN SATURATION: 99 % | TEMPERATURE: 97.8 F | BODY MASS INDEX: 21.51 KG/M2 | SYSTOLIC BLOOD PRESSURE: 105 MMHG | DIASTOLIC BLOOD PRESSURE: 72 MMHG | RESPIRATION RATE: 18 BRPM | HEART RATE: 92 BPM

## 2023-05-23 DIAGNOSIS — K64.1 GRADE II HEMORRHOIDS: Primary | ICD-10-CM

## 2023-05-23 PROCEDURE — 99213 OFFICE O/P EST LOW 20 MIN: CPT | Performed by: SURGERY

## 2023-05-23 ASSESSMENT — PATIENT HEALTH QUESTIONNAIRE - PHQ9
2. FEELING DOWN, DEPRESSED OR HOPELESS: 0
SUM OF ALL RESPONSES TO PHQ QUESTIONS 1-9: 0
SUM OF ALL RESPONSES TO PHQ QUESTIONS 1-9: 0
SUM OF ALL RESPONSES TO PHQ9 QUESTIONS 1 & 2: 0
SUM OF ALL RESPONSES TO PHQ QUESTIONS 1-9: 0
1. LITTLE INTEREST OR PLEASURE IN DOING THINGS: 0
SUM OF ALL RESPONSES TO PHQ QUESTIONS 1-9: 0

## 2023-05-23 NOTE — PROGRESS NOTES
Identified pt with two pt identifiers (name and ). Reviewed chart in preparation for visit and have obtained necessary documentation. Milagro Hendrickson is a 29 y.o. female  No chief complaint on file. There were no vitals taken for this visit. 1. Have you been to the ER, urgent care clinic since your last visit? Hospitalized since your last visit? No    2. Have you seen or consulted any other health care providers outside of the [address] [place] [address] since your last visit? Include any pap smears or colon screening.  No

## 2023-05-23 NOTE — PROGRESS NOTES
[facility]      Clinic Note - Follow up    Subjective     Gayatri Howard returns for scheduled follow up today. She notes no real change since her last visit. She continues to have bleeding with bowel movements. She also has occasional pain with bowel movements. She has been staying hydrated and using fiber. She has had no relief from topical hemorrhoid cream.    Objective     /72 (Site: Left Upper Arm, Position: Sitting)   Pulse 92   Temp 97.8 °F (36.6 °C) (Temporal)   Resp 18   Ht 5' 4\" (1.626 m)   Wt 126 lb (57.2 kg)   SpO2 99%   BMI 21.63 kg/m²       PE  GEN - Awake, alert, communicating appropriately. NAD  Pulm - CTAB  CV - RRR  Rectal - no masses, external hemorrhoid/skin tag  Ext - warm, well perfused, no edema. All other systems negative unless indicated above. Assessment     Kenyon Holman is a 29 y. o.yr old female with small external and internal hemorrhoids. These are symptomatic with bleeding and pain. Plan     I do think hemorrhoidectomy would be appropriate at this point. We discussed the risks of surgery, including bleeding, infection and anal stricture or incontinence. Her hemorrhoids are small, and she would be low risk. She would like to proceed with surgery. However, she would like to wait for a few months until her  is back from his deployment. We will schedule surgery for that time. 20 mins of time was spent with the patient of which > 50% of the time involved face-to-face counseling of the patient regarding the proposed treatment plan.       Duran Edmond MD  5/23/2023    CC: HORACE Napier

## 2023-05-23 NOTE — TELEPHONE ENCOUNTER
Called patient to schedule follow up appointment and surgery per doctor's orders. Patient wanted to hold off on surgery until September but would like her follow up appt to be in August. After she sees Dr. Frankie Dillon, will we schedule her surgery.

## 2023-08-23 ENCOUNTER — OFFICE VISIT (OUTPATIENT)
Age: 33
End: 2023-08-23
Payer: OTHER GOVERNMENT

## 2023-08-23 VITALS
HEART RATE: 111 BPM | SYSTOLIC BLOOD PRESSURE: 106 MMHG | DIASTOLIC BLOOD PRESSURE: 73 MMHG | WEIGHT: 124 LBS | OXYGEN SATURATION: 96 % | TEMPERATURE: 98.2 F | RESPIRATION RATE: 15 BRPM | BODY MASS INDEX: 21.17 KG/M2 | HEIGHT: 64 IN

## 2023-08-23 DIAGNOSIS — K64.1 GRADE II HEMORRHOIDS: Primary | ICD-10-CM

## 2023-08-23 PROCEDURE — 99213 OFFICE O/P EST LOW 20 MIN: CPT | Performed by: SURGERY

## 2023-08-23 ASSESSMENT — PATIENT HEALTH QUESTIONNAIRE - PHQ9
1. LITTLE INTEREST OR PLEASURE IN DOING THINGS: 0
SUM OF ALL RESPONSES TO PHQ QUESTIONS 1-9: 0
SUM OF ALL RESPONSES TO PHQ9 QUESTIONS 1 & 2: 0
SUM OF ALL RESPONSES TO PHQ QUESTIONS 1-9: 0
2. FEELING DOWN, DEPRESSED OR HOPELESS: 0

## 2023-08-23 NOTE — PROGRESS NOTES
[facility] [place] [facility] History and Physical    Chief Complaint: hemorrhoids    History of Present Illness:      Cholo Rivera is a 29 y.o. female who is here for follow-up of her hemorrhoids. She continues to have blood with bowel movements, as well as pain at times. She has not noticed significant change since her last visit. Past Medical History:   Diagnosis Date    History of lump of right breast 2020       Past Surgical History:   Procedure Laterality Date     SECTION  07/10/2021       Social History     Socioeconomic History    Marital status:      Spouse name: Not on file    Number of children: Not on file    Years of education: Not on file    Highest education level: Not on file   Occupational History    Not on file   Tobacco Use    Smoking status: Never    Smokeless tobacco: Never   Substance and Sexual Activity    Alcohol use: Not Currently    Drug use: Never    Sexual activity: Not on file     Comment: 10-01-21 Nexplanon inserted left arm.  Expires 10-01-24   Other Topics Concern    Not on file   Social History Narrative    Not on file     Social Determinants of Health     Financial Resource Strain: Not on file   Food Insecurity: Not on file   Transportation Needs: Not on file   Physical Activity: Not on file   Stress: Not on file   Social Connections: Not on file   Intimate Partner Violence: Not on file   Housing Stability: Not on file       Family History   Problem Relation Age of Onset    Breast Cancer Mother          Current Outpatient Medications:     etonogestrel (NEXPLANON) 68 MG implant, Inject 68 mg into the skin as needed, Disp: , Rfl:     docusate (COLACE, DULCOLAX) 100 MG CAPS, Take 100 mg by mouth 2 times daily (Patient not taking: Reported on 2023), Disp: , Rfl:     hydrocortisone 2.5 % cream, Place rectally 3 times daily (Patient not taking: Reported on 2023), Disp: , Rfl:     No Known Allergies    ROS   Constitutional: negative  Ears,

## 2023-08-24 ENCOUNTER — TELEPHONE (OUTPATIENT)
Age: 33
End: 2023-08-24

## 2023-09-15 ENCOUNTER — TELEPHONE (OUTPATIENT)
Age: 33
End: 2023-09-15

## 2023-09-19 ENCOUNTER — TELEPHONE (OUTPATIENT)
Age: 33
End: 2023-09-19

## 2023-09-29 ENCOUNTER — TELEPHONE (OUTPATIENT)
Age: 33
End: 2023-09-29

## 2023-09-29 NOTE — TELEPHONE ENCOUNTER
Patient came into office regarding surgery that was supposed to be scheduled. She is moving from this area would like to cancel this at this time.

## 2024-03-30 NOTE — PROGRESS NOTES
Problem:  Delivery: Postpartum Day 1  Goal: Off Pathway (Use only if patient is Off Pathway)  Outcome: Progressing Towards Goal  Goal: Activity/Safety  Outcome: Progressing Towards Goal  Goal: Consults, if ordered  Outcome: Progressing Towards Goal  Goal: Diagnostic Test/Procedures  Outcome: Progressing Towards Goal  Goal: Nutrition/Diet  Outcome: Progressing Towards Goal  Goal: Discharge Planning  Outcome: Progressing Towards Goal  Goal: Medications  Outcome: Progressing Towards Goal  Goal: Respiratory  Outcome: Progressing Towards Goal  Goal: Treatments/Interventions/Procedures  Outcome: Progressing Towards Goal  Goal: Psychosocial  Outcome: Progressing Towards Goal  Goal: *Vital signs within defined limits  Outcome: Progressing Towards Goal  Goal: *Labs within defined limits  Outcome: Progressing Towards Goal  Goal: *Hemodynamically stable  Outcome: Progressing Towards Goal  Goal: *Optimal pain control at patient's stated goal  Outcome: Progressing Towards Goal  Goal: *Participates in infant care  Outcome: Progressing Towards Goal  Goal: *Demonstrates progressive activity  Outcome: Progressing Towards Goal  Goal: *Tolerating diet  Outcome: Progressing Towards Goal  Goal: *Performs self perineal care  Outcome: Progressing Towards Goal verbal cues/1 person assist

## (undated) DEVICE — STERILE POLYISOPRENE POWDER-FREE SURGICAL GLOVES WITH EMOLLIENT COATING: Brand: PROTEXIS

## (undated) DEVICE — DRAPE C-SECTION W/WINDOW --

## (undated) DEVICE — APPLICATOR SCRB 26ML TEAL STRL -- CHLORAPREP 26ML

## (undated) DEVICE — GARMENT CMPR STD UNV CALF FLWT -- RN VENTILATE NS DISP 17- IN

## (undated) DEVICE — BAG,SPONGE COUNTER,CLEAR,50/BX,5BX/CS: Brand: MEDLINE

## (undated) DEVICE — SUTURE VCRL SZ 0 L36IN ABSRB UD L40MM CT 1/2 CIR TAPERPOINT J958H

## (undated) DEVICE — HANDLE SURG LIGHT OB

## (undated) DEVICE — DERMABOND SKIN ADH 0.7ML -- DERMABOND ADVANCED 12/BX

## (undated) DEVICE — SUT VCRL + 3 27IN KS UND --

## (undated) DEVICE — REM POLYHESIVE ADULT PATIENT RETURN ELECTRODE: Brand: VALLEYLAB

## (undated) DEVICE — SOL IRR SOD CL 0.9% 1000ML BTL --

## (undated) DEVICE — TRAY URIN CATH PED 16FR BLLN 5CC INDWL STR TIP INF CTRL

## (undated) DEVICE — AGENT HEMSTAT 3GM PURIFIED PLNT STARCH PWD ABSRB ARISTA AH

## (undated) DEVICE — C-SECTION: Brand: MEDLINE INDUSTRIES, INC.